# Patient Record
Sex: FEMALE | Race: BLACK OR AFRICAN AMERICAN | Employment: FULL TIME | URBAN - METROPOLITAN AREA
[De-identification: names, ages, dates, MRNs, and addresses within clinical notes are randomized per-mention and may not be internally consistent; named-entity substitution may affect disease eponyms.]

---

## 2022-11-22 ENCOUNTER — OFFICE VISIT (OUTPATIENT)
Dept: INTERNAL MEDICINE CLINIC | Age: 27
End: 2022-11-22

## 2022-11-22 VITALS
HEIGHT: 56 IN | WEIGHT: 121.6 LBS | HEART RATE: 117 BPM | BODY MASS INDEX: 27.36 KG/M2 | DIASTOLIC BLOOD PRESSURE: 76 MMHG | SYSTOLIC BLOOD PRESSURE: 102 MMHG | OXYGEN SATURATION: 97 %

## 2022-11-22 DIAGNOSIS — S62.101G CLOSED FRACTURE OF RIGHT WRIST WITH DELAYED HEALING, SUBSEQUENT ENCOUNTER: ICD-10-CM

## 2022-11-22 DIAGNOSIS — Z76.89 ESTABLISHING CARE WITH NEW DOCTOR, ENCOUNTER FOR: ICD-10-CM

## 2022-11-22 DIAGNOSIS — E11.65 TYPE 2 DIABETES MELLITUS WITH HYPERGLYCEMIA, WITHOUT LONG-TERM CURRENT USE OF INSULIN (HCC): Primary | ICD-10-CM

## 2022-11-22 LAB
CHP ED QC CHECK: NORMAL
GLUCOSE BLD-MCNC: 387 MG/DL
HBA1C MFR BLD: 11.6 %

## 2022-11-22 PROCEDURE — 99204 OFFICE O/P NEW MOD 45 MIN: CPT | Performed by: NURSE PRACTITIONER

## 2022-11-22 PROCEDURE — 3046F HEMOGLOBIN A1C LEVEL >9.0%: CPT | Performed by: NURSE PRACTITIONER

## 2022-11-22 PROCEDURE — 83036 HEMOGLOBIN GLYCOSYLATED A1C: CPT | Performed by: NURSE PRACTITIONER

## 2022-11-22 PROCEDURE — 82962 GLUCOSE BLOOD TEST: CPT | Performed by: NURSE PRACTITIONER

## 2022-11-22 RX ORDER — ONDANSETRON 4 MG/1
TABLET, ORALLY DISINTEGRATING ORAL EVERY 8 HOURS PRN
COMMUNITY
Start: 2022-04-26 | End: 2022-11-22

## 2022-11-22 RX ORDER — LANCETS 30 GAUGE
1 EACH MISCELLANEOUS
Qty: 100 EACH | Refills: 2 | Status: SHIPPED | OUTPATIENT
Start: 2022-11-22

## 2022-11-22 RX ORDER — FLUTICASONE PROPIONATE 50 MCG
2 SPRAY, SUSPENSION (ML) NASAL DAILY
COMMUNITY
End: 2022-11-22

## 2022-11-22 RX ORDER — AMOXICILLIN AND CLAVULANATE POTASSIUM 875; 125 MG/1; MG/1
TABLET, FILM COATED ORAL
COMMUNITY
Start: 2022-10-18 | End: 2022-11-22

## 2022-11-22 RX ORDER — ATORVASTATIN CALCIUM 10 MG/1
TABLET, FILM COATED ORAL DAILY
COMMUNITY
Start: 2020-08-06 | End: 2022-11-22

## 2022-11-22 RX ORDER — HYDROCODONE BITARTRATE AND ACETAMINOPHEN 5; 325 MG/1; MG/1
TABLET ORAL
COMMUNITY
Start: 2022-10-29 | End: 2022-11-22

## 2022-11-22 RX ORDER — GLUCOSAMINE HCL/CHONDROITIN SU 500-400 MG
1 CAPSULE ORAL
Qty: 100 STRIP | Refills: 2 | Status: SHIPPED | OUTPATIENT
Start: 2022-11-22

## 2022-11-22 RX ORDER — BLOOD-GLUCOSE METER
1 KIT MISCELLANEOUS
Qty: 1 KIT | Refills: 0 | Status: SHIPPED | OUTPATIENT
Start: 2022-11-22

## 2022-11-22 RX ORDER — FAMOTIDINE 20 MG/1
TABLET, FILM COATED ORAL 2 TIMES DAILY
COMMUNITY
Start: 2019-07-07 | End: 2022-11-22

## 2022-11-22 RX ORDER — LORATADINE 10 MG/1
TABLET ORAL DAILY
COMMUNITY
End: 2022-11-22

## 2022-11-22 RX ORDER — INSULIN GLARGINE 100 [IU]/ML
15 INJECTION, SOLUTION SUBCUTANEOUS NIGHTLY
Qty: 5 ADJUSTABLE DOSE PRE-FILLED PEN SYRINGE | Refills: 2 | Status: SHIPPED | OUTPATIENT
Start: 2022-11-22 | End: 2022-12-01 | Stop reason: ALTCHOICE

## 2022-11-22 RX ORDER — ONDANSETRON 4 MG/1
TABLET, FILM COATED ORAL EVERY 8 HOURS PRN
COMMUNITY
Start: 2019-07-07

## 2022-11-22 SDOH — ECONOMIC STABILITY: FOOD INSECURITY: WITHIN THE PAST 12 MONTHS, THE FOOD YOU BOUGHT JUST DIDN'T LAST AND YOU DIDN'T HAVE MONEY TO GET MORE.: NEVER TRUE

## 2022-11-22 SDOH — ECONOMIC STABILITY: FOOD INSECURITY: WITHIN THE PAST 12 MONTHS, YOU WORRIED THAT YOUR FOOD WOULD RUN OUT BEFORE YOU GOT MONEY TO BUY MORE.: NEVER TRUE

## 2022-11-22 ASSESSMENT — PATIENT HEALTH QUESTIONNAIRE - PHQ9
SUM OF ALL RESPONSES TO PHQ QUESTIONS 1-9: 0
1. LITTLE INTEREST OR PLEASURE IN DOING THINGS: 0
SUM OF ALL RESPONSES TO PHQ QUESTIONS 1-9: 0
2. FEELING DOWN, DEPRESSED OR HOPELESS: 0
SUM OF ALL RESPONSES TO PHQ9 QUESTIONS 1 & 2: 0

## 2022-11-22 ASSESSMENT — ENCOUNTER SYMPTOMS
RESPIRATORY NEGATIVE: 1
GASTROINTESTINAL NEGATIVE: 1

## 2022-11-22 ASSESSMENT — SOCIAL DETERMINANTS OF HEALTH (SDOH): HOW HARD IS IT FOR YOU TO PAY FOR THE VERY BASICS LIKE FOOD, HOUSING, MEDICAL CARE, AND HEATING?: NOT HARD AT ALL

## 2022-11-22 NOTE — PROGRESS NOTES
Patient: Barrett Hernandez is a 32 y.o. female who presents today with the following Chief Complaint(s):  Chief Complaint   Patient presents with    New Patient     Establish care       HPI  Presents to the office as a new patient to establish care. Has a history of diabetes but has not been on medication for over a year due to insurance not covering medications. Needs referral for orthopedic specialist for wrist fracture. She is currently in a cast.  Was being seen at [facility] but insurances does not cover the procedure there. Diabetes  She presents for her follow-up diabetic visit. She has type 2 diabetes mellitus. Her disease course has been worsening. There are no diabetic associated symptoms. Risk factors for coronary artery disease include diabetes mellitus. Current diabetic treatment includes diet. Meal planning includes avoidance of concentrated sweets. (A1c is 11. 6) An ACE inhibitor/angiotensin II receptor blocker is not being taken. Current Outpatient Medications   Medication Sig Dispense Refill    metFORMIN (GLUCOPHAGE) 500 MG tablet Take 1 tablet by mouth 2 times daily (with meals) 60 tablet 1    insulin glargine (LANTUS SOLOSTAR) 100 UNIT/ML injection pen Inject 15 Units into the skin nightly 5 Adjustable Dose Pre-filled Pen Syringe 2    glucose monitoring (FREESTYLE FREEDOM) kit 1 kit by Does not apply route 4 times daily (before meals and nightly) 1 kit 0    Lancets MISC 1 each by Does not apply route 4 times daily (before meals and nightly) 100 each 2    blood glucose monitor strips 1 strip by Other route 4 times daily (before meals and nightly) 100 strip 2    Insulin Pen Needle 31G X 5 MM MISC 1 each by Does not apply route daily 100 each 3    ondansetron (ZOFRAN) 4 MG tablet Take by mouth every 8 hours as needed (Patient not taking: Reported on 11/22/2022)       No current facility-administered medications for this visit.        Patient's past medical history, surgical history, family history, medications,  and allergies  were all reviewed and updated as appropriate today. There is no problem list on file for this patient. Past Medical History:   Diagnosis Date    Type 2 diabetes mellitus without complication ([address]       No past surgical history on file. Family History   Problem Relation Age of Onset    High Blood Pressure Mother       No Known Allergies      Review of Systems   Constitutional: Negative. HENT: Negative. Respiratory: Negative. Cardiovascular: Negative. Gastrointestinal: Negative. Genitourinary: Negative. Musculoskeletal:  Positive for arthralgias (Right wrist fracture). Skin: Negative. Neurological: Negative. Psychiatric/Behavioral: Negative. Vitals:    11/22/22 1614   BP: 102/76   Site: Left Upper Arm   Position: Sitting   Cuff Size: Small Adult   Pulse: (!) 117   SpO2: 97%   Weight: 121 lb 9.6 oz (55.2 kg)   Height: 4' 7.5\" (1.41 m)     Physical Exam  Constitutional:       General: She is not in acute distress. Appearance: Normal appearance. She is not ill-appearing, toxic-appearing or diaphoretic. HENT:      Head: Normocephalic. Eyes:      Conjunctiva/sclera: Conjunctivae normal.   Cardiovascular:      Rate and Rhythm: Regular rhythm. Tachycardia present. Pulses: Normal pulses. Heart sounds: Normal heart sounds. No murmur heard. No friction rub. No gallop. Pulmonary:      Effort: Pulmonary effort is normal. No respiratory distress. Breath sounds: Normal breath sounds. No stridor. No wheezing, rhonchi or rales. Abdominal:      General: Bowel sounds are normal. There is no distension. Palpations: Abdomen is soft. There is no mass. Tenderness: There is no abdominal tenderness. There is no guarding. Hernia: No hernia is present. Musculoskeletal:         General: Normal range of motion. Cervical back: Normal range of motion and neck supple. No rigidity. Right lower leg: No edema.       Left lower leg: No edema. Skin:     General: Skin is warm and dry. Neurological:      Mental Status: She is alert and oriented to person, place, and time. Psychiatric:         Mood and Affect: Mood normal.         Behavior: Behavior normal.         Thought Content: Thought content normal.         Judgment: Judgment normal.          Assessment/Plan:  1. Type 2 diabetes mellitus with hyperglycemia, without long-term current use of insulin (HCC)  - insulin glargine (LANTUS SOLOSTAR) 100 UNIT/ML injection pen; Inject 15 Units into the skin nightly  Dispense: 5 Adjustable Dose Pre-filled Pen Syringe; Refill: 2  - glucose monitoring (FREESTYLE FREEDOM) kit; 1 kit by Does not apply route 4 times daily (before meals and nightly)  Dispense: 1 kit; Refill: 0  - Lancets MISC; 1 each by Does not apply route 4 times daily (before meals and nightly)  Dispense: 100 each; Refill: 2  - blood glucose monitor strips; 1 strip by Other route 4 times daily (before meals and nightly)  Dispense: 100 strip; Refill: 2  - Insulin Pen Needle 31G X 5 MM MISC; 1 each by Does not apply route daily  Dispense: 100 each; Refill: 3  - Educational pamphlets given  -Says  will be able to help give her injections while she has the cast on her wrist.    2. Closed fracture of right wrist with delayed healing, subsequent encounter  - [address]    3. Establishing care with new doctor, encounter for  - POCT glycosylated hemoglobin (Hb A1C)  - POCT Glucose      Return in about 1 month (around 12/22/2022), or if symptoms worsen or fail to improve.

## 2022-11-22 NOTE — PATIENT INSTRUCTIONS
Your A1c is 11.6. Your goal is to be 6.5 or lower. Start taking metformin 500 mg once a day and then increase to twice a day with meals as tolerated. Start checking your blood sugar 3 times a day before meals and nightly before bed. Start taking Lantus 15 units nightly. Make sure to eat a low-carb diet. Refer to the handouts    [facility] [place] Laboratory Locations - No appointment necessary. @ indicates the location is open Saturdays in addition to Monday through Friday. Call your preferred location for test preparation, business hours and other information you need. [employer] accepts [employer]. [facility]    @ [place] Lab Svcs. 3 [facility] [address] [place] [address]. [place], [address]   Ph: [phone] [facility] Lab Svcs. [address] [facility] [address]   Ph: [phone]  @ [place] [address] Lab Svcs. [address]   Ph: [phone]    [facility] Lab Svcs. [address]   Ph: [phone] @ [place] [facility] Svcs. [address]  Ph: [phone] @ [facility] Sv[facility]. [address] [facility]. [place], [address]   Ph: [phone]     Jeanine Lowery Svcs. [facility], [address]  Ph: [phone]    [place]   @ [place] Lab Svcs. [address] [place], [place] [address]   Ph: [phone] [facility]. Office Bldg. [address] Jim[address], [address] [facility]  Ph: [address] [place] [address]   [facility]:  [address] Lab Svcs[address] 54 [facility]   Ph: [phone]. Lab Svcs.   [address] [facility], [address]   Ph: [phone]

## 2022-11-30 ENCOUNTER — OFFICE VISIT (OUTPATIENT)
Dept: ORTHOPEDIC SURGERY | Age: 27
End: 2022-11-30

## 2022-11-30 VITALS — BODY MASS INDEX: 28.12 KG/M2 | WEIGHT: 125 LBS | HEIGHT: 56 IN

## 2022-11-30 DIAGNOSIS — F17.200 CURRENT SMOKER: ICD-10-CM

## 2022-11-30 DIAGNOSIS — M25.531 RIGHT WRIST PAIN: ICD-10-CM

## 2022-11-30 DIAGNOSIS — S52.591A OTHER CLOSED FRACTURE OF DISTAL END OF RIGHT RADIUS, INITIAL ENCOUNTER: Primary | ICD-10-CM

## 2022-11-30 PROBLEM — S52.501A CLOSED FRACTURE OF RIGHT DISTAL RADIUS: Status: ACTIVE | Noted: 2022-11-30

## 2022-11-30 NOTE — LETTER
[facility]  Surgery Scheduling Form:      DEMOGRAPHICS:                                                                                                                  Patient Name:  Chelly Nielson  Patient :  1995   Patient SS#:      Patient Phone:  [phone] (home)  Alt. Patient Phone:    Patient Address:  [address] [place] [id number]    PCP:  NGHIA Sanders    Insurance ID Number:  Payer/Plan Subscr  Sex Relation Sub. Ins. ID Effective Group Num         DIAGNOSIS & PROCEDURE:                                                                                                Diagnosis:   Right Distal radius fracture  S52.501A  Operation:  Open reduction internal fixation Right distal radius [address]37  Location: [place]  Provider:  Vero Caldwell. Angela Tilley MD      [facility] INFORMATION:                                                                                         .    Requested Date:      OR Time:  8:15 AM                      Patient Arrival Time:  6:30 AM  OR Time Required:  30 Minutes  Anesthesia:  General  Equipment:  Hilda  Mini C-Arm:  Yes   Standard C-Arm:  No  Status:  Outpatient  PAT Required:  Yes  Comments:             Gordon Tilley MD     22         Pre Operative Physician Prophylaxis Orders - SCIP Protocols      Patient: Chelly Nielson  :    1995    Procedure:  Open reduction internal fixation Right distal radius [address]      HEIGHT:  Ht Readings from Last 1 Encounters:   22 4' 7.5\" (1.41 m)                      WEIGHT:  Wt Readings from Last 1 Encounters:   22 125 lb (56.7 kg)         History & Physical:  By Surgeon    Pre-Operative Antibiotic Order:     []  ----  No Antibiotic Ordered       [x]  ----  Give the following Antibiotic within 1 hour prior to start time:                                                      Cefazolin 2g IV <119.9kg (264lbs) OR 3g if >120kg (264lbs)       or      Clindamycin 900 mg IV (over 1 hour) if patient is allergic to           PENICILLINS or CAPHALOSPORIN       VTE prophylaxis [ ] Thigh hi  TEDS  [ ]  Knee Hi TEDS [ ] Foot Pumps [ ] Compression Devices      Physician Signature:     Date: 11/30/22  Time: 2:09 PM
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
## 2022-12-01 ENCOUNTER — TELEPHONE (OUTPATIENT)
Dept: ORTHOPEDIC SURGERY | Age: 27
End: 2022-12-01

## 2022-12-01 NOTE — TELEPHONE ENCOUNTER
Auth: NPR  Date: 12/05/22  Reference # None  Spoke with: None  Type of SX: Outpatient  Location: [facility]  CPT: 82524   DX: S52.501A  SX area:  Rt wrist  Insurance: Self-Pay

## 2022-12-01 NOTE — PROGRESS NOTES
[address] [facility] [place] time _____0615_______        Surgery time ___0815_________    Take the following medications with a sip of water: Follow your MD/Surgeons pre-procedure instructions regarding your medications     Do not eat or drink anything after 12:00 midnight prior to your surgery. This includes water chewing gum, mints and ice chips. You may brush your teeth and gargle the morning of your surgery, but do not swallow the water     Please see your family doctor/pediatrician for a history and physical and/or concerning medications. Bring any test results/reports from your physicians office. If you are under the care of a heart doctor or specialist doctor, please be aware that you may be asked to them for clearance    You may be asked to stop blood thinners such as Coumadin, Plavix, Fragmin, Lovenox, etc., or any anti-inflammatories such as:  Aspirin, Ibuprofen, Advil, Naproxen prior to your surgery. We also ask that you stop any OTC medications such as fish oil, vitamin E, glucosamine, garlic, Multivitamins, COQ 10, etc.    We ask that you do not smoke 24 hours prior to surgery  We ask that you do not  drink any alcoholic beverages 24 hours prior to surgery     You must make arrangements for a responsible adult to take you home after your surgery. For your safety you will not be allowed to leave alone or drive yourself home. Your surgery will be cancelled if you do not have a ride home. Also for your safety, it is strongly suggested that someone stay with you the first 24 hours after your surgery. A parent or legal guardian must accompany a child scheduled for surgery and plan to stay at the hospital until the child is discharged. Please do not bring other children with you. For your comfort, please wear simple loose fitting clothing to the hospital.  Please do not bring valuables.     Do not wear any make-up or nail polish on your fingers or toes      For your safety, please do not wear any jewelry or body piercing's on the day of surgery. All jewelry must be removed. If you have dentures, they will be removed before going to operating room. For your convenience, we will provide you with a container. If you wear contact lenses or glasses, they will be removed, please bring a case for them. If you have a living will and a durable power of  for healthcare, please bring in a copy. As part of our patient safety program to minimize surgical site infections, we ask you to do the following:    Please notify your surgeon if you develop any illness between         now and the  day of your surgery. This includes a cough, cold, fever, sore throat, nausea,         or vomiting, and diarrhea, etc.   Please notify your surgeon if you experience dizziness, shortness         of breath or blurred vision between now and the time of your surgery. Do not shave your operative site 96 hours prior to surgery. For face and neck surgery, men may use an electric razor 48 hours   prior to surgery. You may shower the night before surgery or the morning of   your surgery with an antibacterial soap. You will need to bring a photo ID and insurance card    [facility] has an onsite pharmacy, would you like to utilize our pharmacy     If you will be staying overnight and use a C-pap machine, please bring   your C-pap to hospital     Our goal is to provide you with excellent care, therefore, visitors will be limited to two(2) in the room at a time so that we may focus on providing this care for you. Please contact pre-admission testing if you have any further questions. [facility] phone number:  [phone] PAT fax number:  [phone]  Please note these are generalized instructions for all surgical cases, you may be provided with more specific instructions according to your surgery.     C-Difficile admission screening and protocol:       * Admitted with diarrhea? [] YES    [x]  NO     *Prior history of C-Diff. In last 3 months? [] YES    [x]  NO     *Antibiotic use in the past 6-8 weeks? []  NO    [x]  YES                 If yes, which ANTIBIOTIC AND REASON_infected thumb_____     *Prior hospitalization or nursing home in the last month? []  YES    [x]  NO        SAFETY FIRST. .call before you fall

## 2022-12-01 NOTE — TELEPHONE ENCOUNTER
Called patient. Gave her a time for surgery on Monday. Discussed medications. Patient is going to fax over paperwork to be off work. She would like to remain out atleast for the 2 week PO time period, though longer if possible.

## 2022-12-02 ENCOUNTER — ANESTHESIA EVENT (OUTPATIENT)
Dept: OPERATING ROOM | Age: 27
End: 2022-12-02
Payer: COMMERCIAL

## 2022-12-02 NOTE — TELEPHONE ENCOUNTER
Spoke with Thomas @ [facility]. SX requires an authorizaiton. Transferred to Leeanne (Supervisor). They are unsure of who does the Precert. Leeanne is to call me back.

## 2022-12-02 NOTE — TELEPHONE ENCOUNTER
On hold for 45 mins, someone picked up said hello and then nothing. After waiting 50 mins, they hung up on me. Called back starting time 2:41. After an hr finally got thru no new information. Still unable to get precert started.

## 2022-12-05 ENCOUNTER — HOSPITAL ENCOUNTER (OUTPATIENT)
Age: 27
Setting detail: OUTPATIENT SURGERY
Discharge: HOME | End: 2022-12-05
Attending: ORTHOPAEDIC SURGERY | Admitting: ORTHOPAEDIC SURGERY
Payer: COMMERCIAL

## 2022-12-05 ENCOUNTER — APPOINTMENT (OUTPATIENT)
Dept: GENERAL RADIOLOGY | Age: 27
End: 2022-12-05
Attending: ORTHOPAEDIC SURGERY
Payer: COMMERCIAL

## 2022-12-05 ENCOUNTER — ANESTHESIA (OUTPATIENT)
Dept: OPERATING ROOM | Age: 27
End: 2022-12-05
Payer: COMMERCIAL

## 2022-12-05 VITALS
HEART RATE: 102 BPM | BODY MASS INDEX: 28.93 KG/M2 | OXYGEN SATURATION: 96 % | SYSTOLIC BLOOD PRESSURE: 138 MMHG | DIASTOLIC BLOOD PRESSURE: 92 MMHG | HEIGHT: 55 IN | WEIGHT: 125 LBS | RESPIRATION RATE: 15 BRPM | TEMPERATURE: 97 F

## 2022-12-05 DIAGNOSIS — S52.571A OTHER CLOSED INTRA-ARTICULAR FRACTURE OF DISTAL END OF RIGHT RADIUS, INITIAL ENCOUNTER: Primary | ICD-10-CM

## 2022-12-05 LAB
GLUCOSE BLD-MCNC: 288 MG/DL (ref 70–99)
GLUCOSE BLD-MCNC: 293 MG/DL (ref 70–99)
PERFORMED ON: ABNORMAL
PERFORMED ON: ABNORMAL
PREGNANCY, URINE: NEGATIVE

## 2022-12-05 PROCEDURE — 2580000003 HC RX 258: Performed by: ANESTHESIOLOGY

## 2022-12-05 PROCEDURE — 6360000002 HC RX W HCPCS: Performed by: ANESTHESIOLOGY

## 2022-12-05 PROCEDURE — C1769 GUIDE WIRE: HCPCS | Performed by: ORTHOPAEDIC SURGERY

## 2022-12-05 PROCEDURE — 6360000002 HC RX W HCPCS

## 2022-12-05 PROCEDURE — 2709999900 HC NON-CHARGEABLE SUPPLY: Performed by: ORTHOPAEDIC SURGERY

## 2022-12-05 PROCEDURE — 3700000001 HC ADD 15 MINUTES (ANESTHESIA): Performed by: ORTHOPAEDIC SURGERY

## 2022-12-05 PROCEDURE — 7100000000 HC PACU RECOVERY - FIRST 15 MIN: Performed by: ORTHOPAEDIC SURGERY

## 2022-12-05 PROCEDURE — 7100000011 HC PHASE II RECOVERY - ADDTL 15 MIN: Performed by: ORTHOPAEDIC SURGERY

## 2022-12-05 PROCEDURE — 73100 X-RAY EXAM OF WRIST: CPT

## 2022-12-05 PROCEDURE — 3700000000 HC ANESTHESIA ATTENDED CARE: Performed by: ORTHOPAEDIC SURGERY

## 2022-12-05 PROCEDURE — 84703 CHORIONIC GONADOTROPIN ASSAY: CPT

## 2022-12-05 PROCEDURE — 7100000010 HC PHASE II RECOVERY - FIRST 15 MIN: Performed by: ORTHOPAEDIC SURGERY

## 2022-12-05 PROCEDURE — 3600000014 HC SURGERY LEVEL 4 ADDTL 15MIN: Performed by: ORTHOPAEDIC SURGERY

## 2022-12-05 PROCEDURE — A4217 STERILE WATER/SALINE, 500 ML: HCPCS | Performed by: ORTHOPAEDIC SURGERY

## 2022-12-05 PROCEDURE — C1713 ANCHOR/SCREW BN/BN,TIS/BN: HCPCS | Performed by: ORTHOPAEDIC SURGERY

## 2022-12-05 PROCEDURE — 6360000002 HC RX W HCPCS: Performed by: ORTHOPAEDIC SURGERY

## 2022-12-05 PROCEDURE — 3209999900 FLUORO FOR SURGICAL PROCEDURES

## 2022-12-05 PROCEDURE — 7100000001 HC PACU RECOVERY - ADDTL 15 MIN: Performed by: ORTHOPAEDIC SURGERY

## 2022-12-05 PROCEDURE — 2580000003 HC RX 258: Performed by: ORTHOPAEDIC SURGERY

## 2022-12-05 PROCEDURE — 3600000004 HC SURGERY LEVEL 4 BASE: Performed by: ORTHOPAEDIC SURGERY

## 2022-12-05 PROCEDURE — 2720000010 HC SURG SUPPLY STERILE: Performed by: ORTHOPAEDIC SURGERY

## 2022-12-05 PROCEDURE — 2500000003 HC RX 250 WO HCPCS: Performed by: ORTHOPAEDIC SURGERY

## 2022-12-05 PROCEDURE — 2500000003 HC RX 250 WO HCPCS

## 2022-12-05 DEVICE — BONE SCREW, FULLY THREADED, T8
Type: IMPLANTABLE DEVICE | Site: WRIST | Status: FUNCTIONAL
Brand: VARIAX

## 2022-12-05 DEVICE — LOCKING SCREW, FULLY THREADED,T8
Type: IMPLANTABLE DEVICE | Site: WRIST | Status: FUNCTIONAL
Brand: VARIAX

## 2022-12-05 DEVICE — VOLAR PLATE INTERMEDIATE RIGHT, X-SHORT
Type: IMPLANTABLE DEVICE | Site: WRIST | Status: FUNCTIONAL
Brand: VARIAX

## 2022-12-05 RX ORDER — FENTANYL CITRATE 50 UG/ML
INJECTION, SOLUTION INTRAMUSCULAR; INTRAVENOUS PRN
Status: DISCONTINUED | OUTPATIENT
Start: 2022-12-05 | End: 2022-12-05 | Stop reason: SDUPTHER

## 2022-12-05 RX ORDER — FENTANYL CITRATE 50 UG/ML
25 INJECTION, SOLUTION INTRAMUSCULAR; INTRAVENOUS EVERY 5 MIN PRN
Status: DISCONTINUED | OUTPATIENT
Start: 2022-12-05 | End: 2022-12-05 | Stop reason: HOSPADM

## 2022-12-05 RX ORDER — ONDANSETRON 2 MG/ML
4 INJECTION INTRAMUSCULAR; INTRAVENOUS
Status: COMPLETED | OUTPATIENT
Start: 2022-12-05 | End: 2022-12-05

## 2022-12-05 RX ORDER — SODIUM CHLORIDE 0.9 % (FLUSH) 0.9 %
5-40 SYRINGE (ML) INJECTION EVERY 12 HOURS SCHEDULED
Status: DISCONTINUED | OUTPATIENT
Start: 2022-12-05 | End: 2022-12-05 | Stop reason: HOSPADM

## 2022-12-05 RX ORDER — KETOROLAC TROMETHAMINE 30 MG/ML
INJECTION, SOLUTION INTRAMUSCULAR; INTRAVENOUS PRN
Status: DISCONTINUED | OUTPATIENT
Start: 2022-12-05 | End: 2022-12-05 | Stop reason: SDUPTHER

## 2022-12-05 RX ORDER — ONDANSETRON 2 MG/ML
INJECTION INTRAMUSCULAR; INTRAVENOUS PRN
Status: DISCONTINUED | OUTPATIENT
Start: 2022-12-05 | End: 2022-12-05 | Stop reason: SDUPTHER

## 2022-12-05 RX ORDER — ESMOLOL HYDROCHLORIDE 10 MG/ML
INJECTION INTRAVENOUS PRN
Status: DISCONTINUED | OUTPATIENT
Start: 2022-12-05 | End: 2022-12-05 | Stop reason: SDUPTHER

## 2022-12-05 RX ORDER — DEXAMETHASONE SODIUM PHOSPHATE 4 MG/ML
INJECTION, SOLUTION INTRA-ARTICULAR; INTRALESIONAL; INTRAMUSCULAR; INTRAVENOUS; SOFT TISSUE PRN
Status: DISCONTINUED | OUTPATIENT
Start: 2022-12-05 | End: 2022-12-05 | Stop reason: SDUPTHER

## 2022-12-05 RX ORDER — CEPHALEXIN 500 MG/1
500 CAPSULE ORAL 4 TIMES DAILY
Qty: 20 CAPSULE | Refills: 0 | Status: SHIPPED | OUTPATIENT
Start: 2022-12-05 | End: 2022-12-10

## 2022-12-05 RX ORDER — PROPOFOL 10 MG/ML
INJECTION, EMULSION INTRAVENOUS PRN
Status: DISCONTINUED | OUTPATIENT
Start: 2022-12-05 | End: 2022-12-05 | Stop reason: SDUPTHER

## 2022-12-05 RX ORDER — SODIUM CHLORIDE 0.9 % (FLUSH) 0.9 %
5-40 SYRINGE (ML) INJECTION PRN
Status: DISCONTINUED | OUTPATIENT
Start: 2022-12-05 | End: 2022-12-05 | Stop reason: HOSPADM

## 2022-12-05 RX ORDER — SODIUM CHLORIDE 9 MG/ML
INJECTION, SOLUTION INTRAVENOUS PRN
Status: DISCONTINUED | OUTPATIENT
Start: 2022-12-05 | End: 2022-12-05 | Stop reason: HOSPADM

## 2022-12-05 RX ORDER — HYDROCODONE BITARTRATE AND ACETAMINOPHEN 5; 325 MG/1; MG/1
1 TABLET ORAL EVERY 6 HOURS PRN
Qty: 20 TABLET | Refills: 0 | Status: SHIPPED | OUTPATIENT
Start: 2022-12-05 | End: 2022-12-10

## 2022-12-05 RX ORDER — LIDOCAINE HYDROCHLORIDE 20 MG/ML
INJECTION, SOLUTION EPIDURAL; INFILTRATION; INTRACAUDAL; PERINEURAL PRN
Status: DISCONTINUED | OUTPATIENT
Start: 2022-12-05 | End: 2022-12-05 | Stop reason: SDUPTHER

## 2022-12-05 RX ORDER — SODIUM CHLORIDE 9 MG/ML
INJECTION, SOLUTION INTRAVENOUS CONTINUOUS
Status: DISCONTINUED | OUTPATIENT
Start: 2022-12-05 | End: 2022-12-05 | Stop reason: HOSPADM

## 2022-12-05 RX ORDER — BUPIVACAINE HYDROCHLORIDE 5 MG/ML
INJECTION, SOLUTION EPIDURAL; INTRACAUDAL
Status: COMPLETED | OUTPATIENT
Start: 2022-12-05 | End: 2022-12-05

## 2022-12-05 RX ORDER — FAMOTIDINE 10 MG/ML
INJECTION, SOLUTION INTRAVENOUS PRN
Status: DISCONTINUED | OUTPATIENT
Start: 2022-12-05 | End: 2022-12-05 | Stop reason: SDUPTHER

## 2022-12-05 RX ORDER — MIDAZOLAM HYDROCHLORIDE 1 MG/ML
INJECTION INTRAMUSCULAR; INTRAVENOUS PRN
Status: DISCONTINUED | OUTPATIENT
Start: 2022-12-05 | End: 2022-12-05 | Stop reason: SDUPTHER

## 2022-12-05 RX ADMIN — MIDAZOLAM 2 MG: 1 INJECTION INTRAMUSCULAR; INTRAVENOUS at 08:17

## 2022-12-05 RX ADMIN — HYDROMORPHONE HYDROCHLORIDE 0.75 MG: 1 INJECTION, SOLUTION INTRAMUSCULAR; INTRAVENOUS; SUBCUTANEOUS at 08:34

## 2022-12-05 RX ADMIN — PROPOFOL 50 MG: 10 INJECTION, EMULSION INTRAVENOUS at 08:18

## 2022-12-05 RX ADMIN — LIDOCAINE HYDROCHLORIDE 80 MG: 20 INJECTION, SOLUTION EPIDURAL; INFILTRATION; INTRACAUDAL; PERINEURAL at 08:18

## 2022-12-05 RX ADMIN — FENTANYL CITRATE 50 MCG: 50 INJECTION INTRAMUSCULAR; INTRAVENOUS at 08:39

## 2022-12-05 RX ADMIN — SODIUM CHLORIDE: 9 INJECTION, SOLUTION INTRAVENOUS at 11:20

## 2022-12-05 RX ADMIN — ONDANSETRON 4 MG: 2 INJECTION INTRAMUSCULAR; INTRAVENOUS at 10:03

## 2022-12-05 RX ADMIN — FAMOTIDINE 20 MG: 10 INJECTION, SOLUTION INTRAVENOUS at 08:17

## 2022-12-05 RX ADMIN — DEXAMETHASONE SODIUM PHOSPHATE 4 MG: 4 INJECTION, SOLUTION INTRAMUSCULAR; INTRAVENOUS at 08:24

## 2022-12-05 RX ADMIN — CEFAZOLIN 2000 MG: 2 INJECTION, POWDER, FOR SOLUTION INTRAMUSCULAR; INTRAVENOUS at 08:25

## 2022-12-05 RX ADMIN — KETOROLAC TROMETHAMINE 30 MG: 30 INJECTION, SOLUTION INTRAMUSCULAR at 09:27

## 2022-12-05 RX ADMIN — HYDROMORPHONE HYDROCHLORIDE 0.25 MG: 1 INJECTION, SOLUTION INTRAMUSCULAR; INTRAVENOUS; SUBCUTANEOUS at 08:17

## 2022-12-05 RX ADMIN — FENTANYL CITRATE 50 MCG: 50 INJECTION INTRAMUSCULAR; INTRAVENOUS at 08:18

## 2022-12-05 RX ADMIN — SODIUM CHLORIDE: 9 INJECTION, SOLUTION INTRAVENOUS at 08:17

## 2022-12-05 RX ADMIN — ESMOLOL HYDROCHLORIDE 10 MG: 100 INJECTION, SOLUTION INTRAVENOUS at 09:27

## 2022-12-05 RX ADMIN — ONDANSETRON 4 MG: 2 INJECTION INTRAMUSCULAR; INTRAVENOUS at 08:24

## 2022-12-05 ASSESSMENT — PAIN DESCRIPTION - PAIN TYPE: TYPE: SURGICAL PAIN

## 2022-12-05 ASSESSMENT — PAIN - FUNCTIONAL ASSESSMENT
PAIN_FUNCTIONAL_ASSESSMENT: NONE - DENIES PAIN
PAIN_FUNCTIONAL_ASSESSMENT: PREVENTS OR INTERFERES SOME ACTIVE ACTIVITIES AND ADLS
PAIN_FUNCTIONAL_ASSESSMENT: 0-10
PAIN_FUNCTIONAL_ASSESSMENT: PREVENTS OR INTERFERES SOME ACTIVE ACTIVITIES AND ADLS

## 2022-12-05 ASSESSMENT — ENCOUNTER SYMPTOMS: SHORTNESS OF BREATH: 0

## 2022-12-05 ASSESSMENT — PAIN DESCRIPTION - ONSET: ONSET: ON-GOING

## 2022-12-05 ASSESSMENT — PAIN DESCRIPTION - ORIENTATION: ORIENTATION: RIGHT

## 2022-12-05 ASSESSMENT — PAIN DESCRIPTION - FREQUENCY: FREQUENCY: CONTINUOUS

## 2022-12-05 ASSESSMENT — PAIN DESCRIPTION - LOCATION: LOCATION: ARM

## 2022-12-05 ASSESSMENT — PAIN DESCRIPTION - DESCRIPTORS
DESCRIPTORS: DULL;DISCOMFORT
DESCRIPTORS: ACHING

## 2022-12-05 ASSESSMENT — PAIN SCALES - GENERAL: PAINLEVEL_OUTOF10: 4

## 2022-12-05 NOTE — PROGRESS NOTES
Received from PACU. Admitted to Phase 2 care. Awake and alert, respirations easy and even. Oriented to room and surroundings. States \"a little nauseated\". Wants to rest.  IV fluids continue.

## 2022-12-05 NOTE — DISCHARGE INSTRUCTIONS
Post op instruction:  1- D/C home  2- Dx Right wrist pain/ displaced distal radius and ulnar fracture. 3- No Weight Bearing right wrist  4- Elevation surgical site, with ice  5- Keep splint dry and clean  6- F/U in 2 weeks.   7- For DVT prophylaxis- Aspirin 325 mg daily     Terese Pappas MD, 12/5/2022

## 2022-12-05 NOTE — H&P
Preoperative H&P Update    The patient's History and Physical in the medical record from 11/30/2022 was reviewed by me today. Past Medical History:   Diagnosis Date    Type 2 diabetes mellitus without complication ([address]      History reviewed. No pertinent surgical history. No current facility-administered medications on file prior to encounter. Current Outpatient Medications on File Prior to Encounter   Medication Sig Dispense Refill    ondansetron (ZOFRAN) 4 MG tablet Take by mouth every 8 hours as needed (Patient not taking: No sig reported)      metFORMIN (GLUCOPHAGE) 500 MG tablet Take 1 tablet by mouth 2 times daily (with meals) 60 tablet 1    glucose monitoring (FREESTYLE FREEDOM) kit 1 kit by Does not apply route 4 times daily (before meals and nightly) 1 kit 0    Lancets MISC 1 each by Does not apply route 4 times daily (before meals and nightly) 100 each 2    blood glucose monitor strips 1 strip by Other route 4 times daily (before meals and nightly) 100 strip 2    Insulin Pen Needle 31G X 5 MM MISC 1 each by Does not apply route daily 100 each 3       No Known Allergies   I reviewed the HPI, medications, allergies, reason for surgery, diagnosis and treatment plan and there has been no change. The patient was evaluated by me today. Physical exam findings for this update include: There were no vitals filed for this visit. Airway is intact  Chest: chest clear, no wheezing, rales, normal symmetric air entry, no tachypnea, retractions or cyanosis  Heart: regular rate and rhythm ; heart sounds normal  Findings on exam of the body region where surgery is to be performed include:  Right wrist pain/ displaced distal radius and ulnar fracture.     Electronically signed by Reynold Wray MD on 12/5/2022 at 7:01 AM

## 2022-12-05 NOTE — PROGRESS NOTES
Taking sips of cola. IV fluids continue. Complains of feeling \"dizzy\".  at bedside. Discharge instructions given to patient and . Verbalize understanding.

## 2022-12-05 NOTE — TELEPHONE ENCOUNTER
Called [phone] again. After a long hold time spoke with [facility] rosalie Man. She tried to transfer me back to [phone] for precert. Informed him that I've already spoken to them twice along with a supervisor. He left a message for a supervisor to call me back priority this morning.

## 2022-12-05 NOTE — ANESTHESIA POSTPROCEDURE EVALUATION
Department of Anesthesiology  Postprocedure Note    Patient: Tatyana Cesar  MRN: [id number]  YOB: 1995  Date of evaluation: 12/5/2022      Procedure Summary     Date: 12/05/22 Room / Location: [address]    Anesthesia Start: 7286 Anesthesia Stop: 2439    Procedure: OPEN REDUCTION INTERNAL FIXATION RIGHT DISTAL RADIUS (Right: Wrist) Diagnosis:       Closed fracture of distal end of right radius, unspecified fracture morphology, initial encounter      (RIGHT DISTAL RADIUS FRACTURE)    Surgeons: Shelley Davila MD Responsible Provider: Diana Cisneros MD    Anesthesia Type: general ASA Status: 3          Anesthesia Type: No value filed.     Gilmer Phase I: Gilmer Score: 10    Gilmer Phase II: Gilmer Score: 9      Anesthesia Post Evaluation    Patient location during evaluation: PACU  Patient participation: complete - patient participated  Level of consciousness: awake and alert  Airway patency: patent  Nausea & Vomiting: no nausea and no vomiting  Complications: no  Cardiovascular status: hemodynamically stable  Respiratory status: acceptable  Hydration status: stable

## 2022-12-05 NOTE — ANESTHESIA PRE PROCEDURE
Department of Anesthesiology  Preprocedure Note       Name:  Cassi Collins   Age:  32 y.o.  :  1995                                          MRN:  [id number]         Date:  2022      Surgeon: Elisabeth Reeder):  Maulik Bowen MD    Procedure: Procedure(s):  OPEN REDUCTION INTERNAL FIXATION RIGHT DISTAL RADIUS    Medications prior to admission:   Prior to Admission medications    Medication Sig Start Date End Date Taking? Authorizing Provider   cephALEXin (KEFLEX) 500 MG capsule Take 1 capsule by mouth 4 times daily for 5 days 12/5/22 12/10/22 Yes Maulik Bowen MD   HYDROcodone-acetaminophen (NORCO) 5-325 MG per tablet Take 1 tablet by mouth every 6 hours as needed for Pain for up to 5 days. Intended supply: 5 days.  Take lowest dose possible to manage pain 12/5/22 12/10/22 Yes Maulik Bowen MD   ondansetron (ZOFRAN) 4 MG tablet Take by mouth every 8 hours as needed  Patient not taking: No sig reported 19   Historical Provider, MD   metFORMIN (GLUCOPHAGE) 500 MG tablet Take 1 tablet by mouth 2 times daily (with meals) 22   NGHIA Maldonado   glucose monitoring (FREESTYLE FREEDOM) kit 1 kit by Does not apply route 4 times daily (before meals and nightly) 22   NGHIA Maldonado   Lancets MISC 1 each by Does not apply route 4 times daily (before meals and nightly) 22   NGHIA Maldonado   blood glucose monitor strips 1 strip by Other route 4 times daily (before meals and nightly) 22   NGHIA Maldonado   Insulin Pen Needle 31G X 5 MM MISC 1 each by Does not apply route daily 22   NGHIA Maldonado       Current medications:    Current Facility-Administered Medications   Medication Dose Route Frequency Provider Last Rate Last Admin    0.9 % sodium chloride infusion   IntraVENous Continuous Klaus Castellon MD        sodium chloride flush 0.9 % injection 5-40 mL  5-40 mL IntraVENous 2 times per day Klaus Castellon MD        sodium chloride flush 0.9 % injection 5-40 mL  5-40 mL IntraVENous PRN Rashaad Guallpa MD        0.9 % sodium chloride infusion   IntraVENous PRN Rashaad Guallpa MD        ceFAZolin (ANCEF) 2,000 mg in dextrose 5 % 50 mL IVPB (mini-bag)  2,000 mg IntraVENous Once Aron Graves MD           Allergies:  No Known Allergies    Problem List:    Patient Active Problem List   Diagnosis Code    Closed fracture of right distal radius S52.501A    Current smoker F17.200       Past Medical History:        Diagnosis Date    Type 2 diabetes mellitus without complication ([facility] [address]        Past Surgical History:  History reviewed. No pertinent surgical history. Social History:    Social History     Tobacco Use    Smoking status: Never    Smokeless tobacco: Never   Substance Use Topics    Alcohol use: Yes     Comment: socially                                Counseling given: Not Answered      Vital Signs (Current):   Vitals:    12/01/22 1210 12/05/22 0635   Weight: 125 lb (56.7 kg) 125 lb (56.7 kg)   Height: 4' 7\" (1.397 m)                                               BP Readings from Last 3 Encounters:   11/22/22 102/76       NPO Status: Time of last liquid consumption: 2100                        Time of last solid consumption: 2100                        Date of last liquid consumption: 12/04/22                        Date of last solid food consumption: 12/04/22    BMI:   Wt Readings from Last 3 Encounters:   12/05/22 125 lb (56.7 kg)   11/30/22 125 lb (56.7 kg)   11/22/22 121 lb 9.6 oz (55.2 kg)     Body mass index is 29.05 kg/m². CBC: No results found for: WBC, RBC, HGB, HCT, MCV, RDW, PLT    CMP:   Lab Results   Component Value Date/Time    GLUCOSE 387 11/22/2022 04:37 PM       POC Tests: No results for input(s): POCGLU, POCNA, POCK, POCCL, POCBUN, POCHEMO, POCHCT in the last 72 hours.     Coags: No results found for: PROTIME, INR, APTT    HCG (If Applicable): No results found for: PREGTESTUR, PREGSERUM, HCG, HCGQUANT     ABGs: No results found for: PHART, PO2ART, ZBB9OAT, VNE9UKV, BEART, W3OETZVY     Type & Screen (If Applicable):  No results found for: LABABO, LABRH    Drug/Infectious Status (If Applicable):  No results found for: HIV, HEPCAB    COVID-19 Screening (If Applicable): No results found for: COVID19        Anesthesia Evaluation  Patient summary reviewed no history of anesthetic complications:   Airway: Mallampati: II  TM distance: >3 FB   Neck ROM: full  Mouth opening: > = 3 FB   Dental:      Comment: No loose teeth    Pulmonary: breath sounds clear to auscultation      (-) COPD, asthma, shortness of breath, recent URI and sleep apnea                          ROS comment: MJ   Cardiovascular:        (-) hypertension, valvular problems/murmurs, past MI, CAD, CABG/stent, dysrhythmias,  angina,  CHF, orthopnea, PND and no pulmonary hypertension      Rhythm: regular  Rate: normal                    Neuro/Psych:      (-) seizures, neuromuscular disease, TIA, CVA, headaches, psychiatric history and depression/anxiety            GI/Hepatic/Renal:        (-) GERD, PUD, hepatitis, liver disease, no renal disease and bowel prep       Endo/Other:    (+) DiabetesType II DM, poorly controlled, , .    (-) hypothyroidism, hyperthyroidism, blood dyscrasia               Abdominal:             Vascular: Other Findings:           Anesthesia Plan      general     ASA 3       Induction: intravenous. MIPS: Postoperative opioids intended and Prophylactic antiemetics administered. Anesthetic plan and risks discussed with patient. Plan discussed with CRNA. This pre-anesthesia assessment may be used as a history and physical.    DOS STAFF ADDENDUM:    Pt seen and examined, chart reviewed (including anesthesia, drug and allergy history). No interval changes to history and physical examination. Anesthetic plan, risks, benefits, alternatives, and personnel involved discussed with patient.   Patient verbalized an understanding and agrees to proceed.       Lulu Rodríguez MD  December 5, 2022  7:05 AM      Lulu Rodríguez MD   12/5/2022

## 2022-12-05 NOTE — PROGRESS NOTES
Pt to phase 2 in stable condition. Pt denies any pain in right arm. Pt states nausea better and ready to get ready to go home. Iv wnl. Circ cks positive and pt able to move fingers and denies numbness or tingling. Dressing dry and intact.

## 2022-12-06 ENCOUNTER — TELEPHONE (OUTPATIENT)
Dept: ORTHOPEDIC SURGERY | Age: 27
End: 2022-12-06

## 2022-12-06 NOTE — TELEPHONE ENCOUNTER
Called [phone] again. Spoke with [address]. He tried to transfer me back to [phone] for precert. Informed him that I've already spoken to them twice along with a supervisor. He saw the  message for a supervisor from yesterday that was marked as priority. No one has looked at it. After an hour on hold he hung up on me.

## 2022-12-06 NOTE — BRIEF OP NOTE
Brief Postoperative Note      Patient: Barrett Hernandez  YOB: 1995  MRN: [id number]    Date of Procedure: 12/5/2022    Pre-Op Diagnosis: RIGHT DISTAL RADIUS FRACTURE    Post-Op Diagnosis: Same       Procedure(s):  OPEN REDUCTION INTERNAL FIXATION RIGHT DISTAL RADIUS    Surgeon(s):  Get Graham MD    Assistant: Kaela Chapman CNP    Anesthesia: General    Estimated Blood Loss (mL): Minimal    Complications: None    Specimens:   * No specimens in log *    Implants:  Implant Name Type Inv.  Item Serial No.  Lot No. LRB No. Used Action   VOLAR DR PLATE INTERMEDIATE RIGHT 10 HOLES EXTRASHORT - [id number]  VOLAR DR PLATE INTERMEDIATE RIGHT 10 HOLES EXTRASHORT  [employer] [facility]  Right 1 Implanted   SCREW BNE L12MM OD27MM ST SONU FULL THRD T8 DRV - [id number]  SCREW BNE L12MM OD27MM ST SONU FULL THRD T8 DR  [employer]  Right 1 Implanted   SCREW BNE L16MM OD27MM ST SONU FULL THRD T8 DRV - [id number]  SCREW BNE L16MM OD27MM ST SONU FULL THRD T8 DRV  [employer]  Right 1 Implanted   SCREW BNE L18MM OD27MM ST SONU FULL THRD T8 DRV - [id number]  SCREW BNE L18MM OD27MM ST SONU FULL THRD T8 DR  [employer]  Right 4 Implanted   SCREW BNE L20MM OD27MM ST SONU FULL THRD T8 DRV - [id number]  SCREW BNE L20MM OD27MM ST SONU FULL THRD T8 DR  [employer]-  Right 2 Implanted   SCREW BNE L12MM OD27MM ST FULL THRD T8 DRV - [id number]  SCREW BNE L12MM OD27MM ST FULL THRD T8 DR  [employer]-  Right 1 Implanted   SCREW BNE L14MM OD27MM ST FULL THRD T8 DRV - [id number]  SCREW BNE L14MM OD27MM ST FULL THRD T8 DR  [employer]  Right 1 Implanted         Drains: * No LDAs found *    Findings: Same    Electronically signed by Get Graham MD on 12/6/2022 at 6:51 AM

## 2022-12-06 NOTE — TELEPHONE ENCOUNTER
General Question     Subject: PAPERWORK FOR JOB  Patient and /or Facility Request: Bijan Gooden  Contact Number: [phone]      I GAVE THE PT THE FAX # SO SHE CAN FAX IN HER PAPERWORK FOR HER JOB. SHE WANTED THE OFFICE TO BE AWARE.
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
## 2022-12-06 NOTE — TELEPHONE ENCOUNTER
Auth: NPR  Date: 12/05/22  Reference # [id number]  Spoke with: Kyler Lynn  Type of SX: Outpatient  Location: W  CPT: 73864   DX: S52.501A  SX area:  Rt wrist  Insurance: TA

## 2022-12-06 NOTE — TELEPHONE ENCOUNTER
General Question     Subject: PATIENT STATES SHE HAS PAPERWORK NEEDING TO BE FILLED OUT FOR RETURN TO WORK. PATIENT REQUESTING TO EMAIL PAPERWORK IF POSSIBLE. PLEASE ADVISE.    Patient Zehra Thomas  Contact Number: [phone]

## 2022-12-07 NOTE — TELEPHONE ENCOUNTER
Patient has not sent the fax over yet. Gave fax number for [address] and notified back in office on Friday. Will check on Friday as patient states she will fax it tonight.

## 2022-12-07 NOTE — TELEPHONE ENCOUNTER
General Question     Subject: REQ CALLBACK  Patient and /or Facility Request: Marlen Cummings  Contact Number:  [phone]    PT REQ CALLBACK AT NUMBER LISTED. REQ TO SPEAK TO SOMEONE IN CLINIC TO CONFIRM PAPERS SHE IS FAXING OVER FOR RTW ARE RECVD AND SIGNED BY PROVIDER. CONTACT PT AT NUMBER LISTED TO ADVISE.

## 2022-12-12 NOTE — TELEPHONE ENCOUNTER
General Question     Subject: REQ CALLBACK  Patient and /or Facility Request: Low Fregoso  Contact Number:  [phone]    REQ CALLBACK ASAP ABOUT RTW PAPERS. CONTACT PT AT NUMBER LISTED TO DISCUSS AND ADVISE.

## 2022-12-19 ENCOUNTER — TELEPHONE (OUTPATIENT)
Dept: ORTHOPEDIC SURGERY | Age: 27
End: 2022-12-19

## 2022-12-21 ENCOUNTER — OFFICE VISIT (OUTPATIENT)
Dept: ORTHOPEDIC SURGERY | Age: 27
End: 2022-12-21

## 2022-12-21 VITALS — BODY MASS INDEX: 28.93 KG/M2 | HEIGHT: 55 IN | WEIGHT: 125 LBS

## 2022-12-21 DIAGNOSIS — S52.591A OTHER CLOSED FRACTURE OF DISTAL END OF RIGHT RADIUS, INITIAL ENCOUNTER: Primary | ICD-10-CM

## 2022-12-21 PROCEDURE — APPNB15 APP NON BILLABLE TIME 0-15 MINS: Performed by: NURSE PRACTITIONER

## 2022-12-21 PROCEDURE — 99024 POSTOP FOLLOW-UP VISIT: CPT | Performed by: NURSE PRACTITIONER

## 2022-12-21 NOTE — PROGRESS NOTES
primary care physician for evaluation for osteoporosis, including consideration for DEXA scanning, if this is felt to be clinically indicated. The patient is advised to contact the primary care physician to follow-up for further evaluation. Procedures    Yoko Abreu Titan Wrist Long Forearm Brace     Patient was prescribed a Yoko Abreu Titan Wrist and Forearm Brace. The right wrist will require stabilization / immobilization from this semi-rigid / rigid orthosis to improve their function. The orthosis will assist in protecting the affected area, provide functional support and facilitate healing. The patient was educated and fit by a healthcare professional with expert knowledge and specialization in brace application while under the direct supervision of the treating physician. Verbal and written instructions for the use of and application of this item were provided. They were instructed to contact the office immediately should the brace result in increased pain, decreased sensation, increased swelling or worsening of the condition.          Asiya Guzman, APRN - CNP

## 2022-12-28 ENCOUNTER — OFFICE VISIT (OUTPATIENT)
Dept: INTERNAL MEDICINE CLINIC | Age: 27
End: 2022-12-28
Payer: COMMERCIAL

## 2022-12-28 VITALS
HEIGHT: 55 IN | HEART RATE: 113 BPM | SYSTOLIC BLOOD PRESSURE: 122 MMHG | OXYGEN SATURATION: 99 % | BODY MASS INDEX: 27.4 KG/M2 | WEIGHT: 118.4 LBS | DIASTOLIC BLOOD PRESSURE: 80 MMHG

## 2022-12-28 DIAGNOSIS — E11.65 TYPE 2 DIABETES MELLITUS WITH HYPERGLYCEMIA, WITHOUT LONG-TERM CURRENT USE OF INSULIN (HCC): ICD-10-CM

## 2022-12-28 PROCEDURE — 99214 OFFICE O/P EST MOD 30 MIN: CPT | Performed by: NURSE PRACTITIONER

## 2022-12-28 PROCEDURE — 3046F HEMOGLOBIN A1C LEVEL >9.0%: CPT | Performed by: NURSE PRACTITIONER

## 2022-12-28 RX ORDER — BLOOD-GLUCOSE METER
1 KIT MISCELLANEOUS
Qty: 1 KIT | Refills: 0 | Status: SHIPPED | OUTPATIENT
Start: 2022-12-28

## 2022-12-28 RX ORDER — INSULIN GLARGINE 100 [IU]/ML
INJECTION, SOLUTION SUBCUTANEOUS NIGHTLY
Qty: 5 ADJUSTABLE DOSE PRE-FILLED PEN SYRINGE | Refills: 3 | Status: CANCELLED | OUTPATIENT
Start: 2022-12-28

## 2022-12-28 RX ORDER — LANCETS 30 GAUGE
1 EACH MISCELLANEOUS
Qty: 100 EACH | Refills: 2 | Status: SHIPPED | OUTPATIENT
Start: 2022-12-28

## 2022-12-28 RX ORDER — INSULIN GLARGINE 100 [IU]/ML
15 INJECTION, SOLUTION SUBCUTANEOUS NIGHTLY
Qty: 5 ADJUSTABLE DOSE PRE-FILLED PEN SYRINGE | Refills: 3 | Status: SHIPPED | OUTPATIENT
Start: 2022-12-28

## 2022-12-28 RX ORDER — GLUCOSAMINE HCL/CHONDROITIN SU 500-400 MG
1 CAPSULE ORAL
Qty: 100 STRIP | Refills: 2 | Status: SHIPPED | OUTPATIENT
Start: 2022-12-28

## 2022-12-28 ASSESSMENT — ENCOUNTER SYMPTOMS
RESPIRATORY NEGATIVE: 1
GASTROINTESTINAL NEGATIVE: 1

## 2022-12-28 NOTE — PATIENT INSTRUCTIONS
Please let me know if your new insurance does not cover your medications. Make sure to eat a low carb diet. Exercise. Continue taking metformin.

## 2022-12-28 NOTE — PROGRESS NOTES
Patient: Ольга Mcwilliams is a 32 y.o. female who presents today with the following Chief Complaint(s):  Chief Complaint   Patient presents with    Follow-up     1 mth f/u       HPI  Presents to the office for diabetes follow-up. Has not been able to get any diabetic supplies or medication due to insurance not covering any of her prescriptions. Has only been taking metformin. Will have new insurance on January 1 through her employer. Diabetes  She presents for her follow-up diabetic visit. She has type 2 diabetes mellitus. Her disease course has been worsening. There are no diabetic associated symptoms. Risk factors for coronary artery disease include diabetes mellitus. Current diabetic treatment includes diet, insulin injections and oral agent (monotherapy). Meal planning includes avoidance of concentrated sweets. (A1c is 11. 6) An ACE inhibitor/angiotensin II receptor blocker is not being taken. Current Outpatient Medications   Medication Sig Dispense Refill    glucose monitoring (FREESTYLE FREEDOM) kit 1 kit by Does not apply route 4 times daily (before meals and nightly) 1 kit 0    Lancets MISC 1 each by Does not apply route 4 times daily (before meals and nightly) 100 each 2    Insulin Pen Needle 31G X 5 MM MISC 1 each by Does not apply route daily 100 each 3    blood glucose monitor strips 1 strip by Other route 4 times daily (before meals and nightly) 100 strip 2    insulin glargine (LANTUS SOLOSTAR) 100 UNIT/ML injection pen Inject 15 Units into the skin nightly 5 Adjustable Dose Pre-filled Pen Syringe 3    metFORMIN (GLUCOPHAGE) 500 MG tablet Take 1 tablet by mouth 2 times daily (with meals) 60 tablet 1    ondansetron (ZOFRAN) 4 MG tablet Take by mouth every 8 hours as needed (Patient not taking: No sig reported)       No current facility-administered medications for this visit.        Patient's past medical history, surgical history, family history, medications,  and allergies  were all reviewed and updated as appropriate today. Patient Active Problem List   Diagnosis    Closed fracture of right distal radius    Current smoker     Past Medical History:   Diagnosis Date    Type 2 diabetes mellitus without complication ([facility] [address]       Past Surgical History:   Procedure Laterality Date    FOREARM SURGERY Right 12/5/2022    OPEN REDUCTION INTERNAL FIXATION RIGHT DISTAL RADIUS performed by Fidencio Lerner MD at [address] [place] [address] History   Problem Relation Age of Onset    High Blood Pressure Mother     No Known Problems Father       No Known Allergies      Review of Systems   Constitutional: Negative. HENT: Negative. Respiratory: Negative. Cardiovascular: Negative. Gastrointestinal: Negative. Genitourinary: Negative. Musculoskeletal:  Positive for arthralgias (Had repair of right wrist fracture. Has follow-up appointment on February 1 with orthopedics. ). Skin: Negative. Neurological: Negative. Psychiatric/Behavioral: Negative. Vitals:    12/28/22 1324   BP: 122/80   Site: Left Upper Arm   Position: Sitting   Cuff Size: Small Adult   Pulse: (!) 113   SpO2: 99%   Weight: 118 lb 6.4 oz (53.7 kg)   Height: 4' 7\" (1.397 m)     Physical Exam  Constitutional:       General: She is not in acute distress. Appearance: Normal appearance. She is not ill-appearing, toxic-appearing or diaphoretic. HENT:      Head: Normocephalic. Eyes:      Conjunctiva/sclera: Conjunctivae normal.   Cardiovascular:      Rate and Rhythm: Regular rhythm. Tachycardia present. Pulses: Normal pulses. Heart sounds: Normal heart sounds. No murmur heard. No friction rub. No gallop. Pulmonary:      Effort: Pulmonary effort is normal. No respiratory distress. Breath sounds: Normal breath sounds. No stridor. No wheezing, rhonchi or rales. Abdominal:      Palpations: Abdomen is soft. Musculoskeletal:         General: Normal range of motion.       Cervical back: Normal range of motion and neck supple. No rigidity. Right lower leg: No edema. Left lower leg: No edema. Skin:     General: Skin is warm and dry. Neurological:      Mental Status: She is alert and oriented to person, place, and time. Psychiatric:         Mood and Affect: Mood normal.         Behavior: Behavior normal.         Thought Content: Thought content normal.         Judgment: Judgment normal.          Assessment/Plan:  1. Type 2 diabetes mellitus with hyperglycemia, without long-term current use of insulin (HCC)  -No samples available. -Advised to follow-up immediately if she is unable to get medications once insurance kicks in.  - glucose monitoring (FREESTYLE FREEDOM) kit; 1 kit by Does not apply route 4 times daily (before meals and nightly)  Dispense: 1 kit; Refill: 0  - Lancets MISC; 1 each by Does not apply route 4 times daily (before meals and nightly)  Dispense: 100 each; Refill: 2  - Insulin Pen Needle 31G X 5 MM MISC; 1 each by Does not apply route daily  Dispense: 100 each; Refill: 3  - blood glucose monitor strips; 1 strip by Other route 4 times daily (before meals and nightly)  Dispense: 100 strip; Refill: 2  - insulin glargine (LANTUS SOLOSTAR) 100 UNIT/ML injection pen; Inject 15 Units into the skin nightly  Dispense: 5 Adjustable Dose Pre-filled Pen Syringe; Refill: 3      Return in about 2 months (around 2/28/2023), or if symptoms worsen or fail to improve.

## 2022-12-29 ENCOUNTER — TELEPHONE (OUTPATIENT)
Dept: ORTHOPEDIC SURGERY | Age: 27
End: 2022-12-29

## 2022-12-29 NOTE — TELEPHONE ENCOUNTER
Return call to patient. Patient states her work sent the paperwork over but she is unsure where her work sent it to. Gave her the fax for medical records to confirm they sent the paperwork to the correct number. Upon return call, patient will verify fax number the paperwork was sent. Should be notified there may be delays in paperwork due to the holidays.

## 2022-12-29 NOTE — TELEPHONE ENCOUNTER
Question     Subject: QS:XCDPU TERM DISABILITY FORMS FROM [employer]  Patient Request: Leigh Ann Reyes  Contact Number: [phone]      PATIENT IS CALLING TO CONFIRM THAT DR. BUNCH DID RECEIVED HER SHORT TERM DISABILITY FORMS FROM [employer] TO BE FILLED OUT FOR PATIENT'S PAY WHILE SHE IS OFF. PLEASE RETURN CALL TO THE ABOVE NUMBER.

## 2023-01-03 ENCOUNTER — TELEPHONE (OUTPATIENT)
Dept: ORTHOPEDIC SURGERY | Age: 28
End: 2023-01-03

## 2023-01-04 ENCOUNTER — TELEPHONE (OUTPATIENT)
Dept: ORTHOPEDIC SURGERY | Age: 28
End: 2023-01-04

## 2023-01-04 NOTE — TELEPHONE ENCOUNTER
Medical records completed paperwork and faxed to [place]. Called patient to notify and left detailed message on voicemail stating paperwork was completed.

## 2023-01-04 NOTE — TELEPHONE ENCOUNTER
Faxed medical records Abdelrahman Westfall) for 12/5/22 to present to [facility] @ [phone]    Claim # [id number]

## 2023-01-05 ENCOUNTER — HOSPITAL ENCOUNTER (OUTPATIENT)
Dept: OCCUPATIONAL THERAPY | Age: 28
Setting detail: THERAPIES SERIES
Discharge: HOME | End: 2023-01-05
Payer: COMMERCIAL

## 2023-01-05 PROCEDURE — 97110 THERAPEUTIC EXERCISES: CPT

## 2023-01-05 PROCEDURE — 97165 OT EVAL LOW COMPLEX 30 MIN: CPT

## 2023-01-06 NOTE — PROGRESS NOTES
Occupational Therapy  Occupational Therapy Initial Assessment  Date:  2023    Patient Name: Severiano Olmstead  MRN: [id number]     :  1995     Treatment Diagnosis: decreased ROM and strength of R hand and wrist    Restrictions:  Position Activity Restriction  Other position/activity restrictions: No heavy lifting/impact R hand  Subjective:   General  Chart Reviewed: Yes  Patient assessed for rehabilitation services?: Yes  Additional Pertinent Hx: Pt is a 32 y.o female who is s/p OPEN REDUCTION INTERNAL FIXATION RIGHT DISTAL RADIUS, 2022. Pt was placed in a Yoko Abreu Titan Wrist Long Forearm Brace. Pt now presents to OP OT with complaints of ongoing R wrist deficits. Self reported health status[de-identified] Good  History obtained from[de-identified] Patient  Family/Caregiver Present: No  Diagnosis: S52.591A (ICD-10-CM) - Other closed fracture of distal end of right radius, initial encounter  Referring Provider (secondary): NGHIA Pedraza - CNP  OT Visit Information  Onset Date: 23  OT Insurance Information: [employer]  Total # of Visits Approved:  (MED NEC)     Social History:   Social History  Lives With: Spouse (nephew)  Type of Home: House  Functional Status:  Functional Status  Prior level of function: Independent  Occupation: Full time employment  Type of Occupation: [employer]   Job Duties: Moderate lifting  ADL Assistance: Independent  Ambulation Assistance: Independent  Transfer Assistance: Independent  Active : Yes (not since injury)     Objective:              Activity Tolerance  Activity Tolerance: Patient tolerated evaluation without incident              Cognition  Overall Cognitive Status: WFL                 LUE AROM (degrees)  LUE AROM : WFL  Left Hand AROM (degrees)  Left Hand AROM: WFL  RUE AROM (degrees)  R Forearm Pron 0-90: 10  R Forearm Supination  0-90: 10  R Wrist Flexion 0-80: 25  R Wrist Extension 0-70: 0  R Wrist Radial Deviation 0-20: 0  R Wrist Ulnar Deviation 0-45: 0  Right Hand AROM (degrees)  Right Hand General AROM: unable to complete full fist ~7 cm of 3rd digit to palm  LUE Strength  Gross LUE Strength: WFL  L Wrist Flex: 5/5  L Wrist Ext: 5/5  L Hand General: 5/5  RUE Strength  Gross RUE Strength: Exceptions to [facility]  R Wrist Flex: 2-/5  R Wrist Ext: 2-/5  R Hand General: 2-/5     Hand Dominance  Hand Dominance: Left  Left Hand Strength -  (lbs)  Handle Setting 2: NA  Right Hand Strength -  (lbs)  Handle Setting 2: NA             Assessment:    Assessment  Performance deficits / Impairments: Decreased ROM; Decreased strength;Decreased high-level IADLs;Decreased sensation  Assessment: Pt is a 32 y.o female who is s/p OPEN REDUCTION INTERNAL FIXATION RIGHT DISTAL RADIUS, 12/5/2022. Pt was placed in a [place]omean Wrist Long Forearm Brace. Pt now presents to OP OT with complaints of ongoing R wrist deficits. Pt demonstrates significant decreased ROM including R wrist and hand/fingers. Pt with minimal noted wrist or finger AROM. Pt noted to have decreased R wrist strength and unable to test  strength due to inability to complete fist. Pt denies any sensation deficits. Pt reports 0/10 pain at rest and up to 6/10 pain with use of R hand. Pt reports not wearing brace \"much\" and is not present for evaluation. Despite ongoing deficits, pt reports completing ADLs Ind with increased difficulty. Pt will benefit from skilled OT services at this time at a frequency of 2x a week for 4 weeks.   Treatment Diagnosis: decreased ROM and strength of R hand and wrist  Prognosis: Good  Decision Making: Low Complexity  Occupational Profile/History : Low  Assessment(s) that identifies: Low  Assistance Modification: Low  Activity Tolerance  Activity Tolerance: Patient tolerated evaluation without incident       Plan:    Occupational Therapy Plan  Current Treatment Recommendations: Strengthening, ROM, Modalities, Patient/Caregiver education & training, Safety education & training, Pain management    G-Code:     OutComes Score:                 QuickDASH Total Score: 28 (01/05/23 1509)       QuickDASH Disability/Symptom Score : 38.64 % (01/05/23 1509)                        Goals:     Patient Goals      Patient goals  increase use of R hand/wrist   Short Term Goals     Time Frame for Short Term Goals 4-6 weeks   Short Term Goal 1 Pt will be Ind with HEP and report completing daily   STG 1 Current Status: --   STG Goal 1 Status: --   Short Term Goal 2 Pt will decrease QuickDASH score by 5 points for increased ability to complete ADLs/IADLs   STG 2 Current Status: --   STG Goal 2 Status: --   Short Term Goal 3 Pt will increase R finger ROM in order to be able to complete light fist and test  strength   STG 3 Current Status: --   STG Goal 3 Status: --   Short Term Goal 4 Pt will increase R wrist flexion by 15 degrees for increased ability to complete ADLs   STG 4 Current Status: --   STG Goal 4 Status: --   Short Term Goal 5 Pt will incease R wrist extension by 15 degrees for increased ability to complete work tasks   STG 5 Current Status: --   STG Goal 5 Status:           Therapy Time:   Individual Concurrent Group Co-treatment   Time In 1505         Time Out 1550         Minutes 45         Timed Code Treatment Minutes: 30 Minutes (15 minute eval)       VINNIE El/JQAUAN

## 2023-01-06 NOTE — PROGRESS NOTES
Occupational Therapy     Outpatient Occupational Therapy  [] [facility] [address] MATEUS VALLE   Phone: [phone]   Fax: [phone]   [x] [facility]  Phone: [phone]   Fax: [phone]  [] Chanetta Leyden   Phone: [phone]   Fax: [phone]      To:  SHAR Winslow      Patient: Jaja Holm  : 1995  MRN: [id number]  Evaluation Date: 2023      Diagnosis Information:  Diagnosis: S52.591A (ICD-10-CM) - Other closed fracture of distal end of right radius, initial encounter   OT Insurance Information: [address]     Occupational Therapy Certification Form  Dear SHAR Winslow,   The following patient has been evaluated for occupational therapy services and for therapy to continue, Medicare requires monthly physician review of the treatment plan. Please review the attached evaluation and/or summary of the patient's plan of care, and verify that you agree therapy should continue by signing the attached document and sending it back to our office.     Plan of Care/Treatment to date:  [x] Therapeutic Exercise   [x] Modalities:  [x] Therapeutic Activity    [] Ultrasound  [] Electrical Stimulation   [] Activities of Daily Living    [] Fluidotherapy [] Kinesiotaping  [] Neuromuscular Re-education   [] Iontophoresis [x] Coldpack/hotpack   [x] Instruction in HEP     [] Contrast Bath  [x] Manual Therapy     Other: Paraffin   [] Aquatic Therapy      [x]       Frequency/Duration:  # Days per week: [] 1 day # Weeks: [] 1 week [] 5 weeks      [x] 2 days   [] 2 weeks [] 6 weeks     [] 3 days   [] 3 weeks [] 7 weeks     [] 4 days   [x] 4 weeks [] 8 weeks    Rehab Potential: [] excellent [x] good [] fair  [] poor     Patient Goals      Patient goals  increase use of R hand/wrist   Short Term Goals     Time Frame for Short Term Goals 4-6 weeks   Short Term Goal 1 Pt will be Ind with HEP and report completing daily   STG 1 Current Status: --   STG Goal 1 Status: --   Short Term Goal 2 Pt will decrease QuickDASH score by 5 points for increased ability to complete ADLs/IADLs   STG 2 Current Status: --   STG Goal 2 Status: --   Short Term Goal 3 Pt will increase R finger ROM in order to be able to complete light fist and test  strength   STG 3 Current Status: --   STG Goal 3 Status: --   Short Term Goal 4 Pt will increase R wrist flexion by 15 degrees for increased ability to complete ADLs   STG 4 Current Status: --   STG Goal 4 Status: --   Short Term Goal 5 Pt will incease R wrist extension by 15 degrees for increased ability to complete work tasks   STG 5 Current Status: --   STG Goal 5 Status:          Electronically signed by:  Edith Ramon OT,OTR/L      If you have any questions or concerns, please don't hesitate to call.   Thank you for your referral.      Physician Signature:________________________________Date:__________________  By signing above, therapists plan is approved by physician

## 2023-01-06 NOTE — PROGRESS NOTES
Occupational Therapy      Occupational Therapy Daily Treatment Note    Date:  2023    Patient Name:  Colleen Suárez     :  1995  MRN: [id number]  Restrictions/Precautions:    Medical/Treatment Diagnosis Information:  Diagnosis: S52.591A (ICD-10-CM) - Other closed fracture of distal end of right radius, initial encounter  Treatment Diagnosis: decreased ROM and strength of R hand and wrist  Insurance/Certification information:  OT Insurance Information: UNITED HEALTHCARE  Physician Information:   NGHIA Henriquez CNP  Plan of care signed (Y/N):  N  Visit# / total visits:     Pain level: 0/10 at rest and up to 6/10 with use     G-Code (if applicable):      Date / Visit # G-Code Applied:  /   QuickDASH Total Score: 28 (23 1509)       QuickDASH Disability/Symptom Score : 38.64 % (23 1509)    Progress Note: [x]  Yes  []  No  Next due by: Visit #10      Subjective: Pt agreeable to OT evaluation. Objective Measures:  Eval 23    Social History:   Social History  Lives With: Spouse (nephew)  Type of Home: House  Functional Status:  Functional Status  Prior level of function: Independent  Occupation: Full time employment  Type of Occupation: [employer]   Job Duties: Moderate lifting  ADL Assistance: Independent  Ambulation Assistance: Independent  Transfer Assistance: Independent  Active : Yes (not since injury)  Objective:     Activity Tolerance  Activity Tolerance: Patient tolerated evaluation without incident  Cognition  Overall Cognitive Status: WFL  LUE AROM (degrees)  LUE AROM : WFL  Left Hand AROM (degrees)  Left Hand AROM: WFL  RUE AROM (degrees)  R Forearm Pron 0-90: 10  R Forearm Supination  0-90: 10  R Wrist Flexion 0-80: 25  R Wrist Extension 0-70: 0  R Wrist Radial Deviation 0-20: 0  R Wrist Ulnar Deviation 0-45: 0  Right Hand AROM (degrees)  Right Hand General AROM: unable to complete full fist ~7 cm of 3rd digit to palm  LUE Strength  Gross LUE Strength: WFL  L Wrist Flex: 5/5  L Wrist Ext: 5/5  L Hand General: 5/5  RUE Strength  Gross RUE Strength: Exceptions to [facility]  R Wrist Flex: 2-/5  R Wrist Ext: 2-/5  R Hand General: 2-/5  Hand Dominance  Hand Dominance: Left  Left Hand Strength -  (lbs)  Handle Setting 2: NA  Right Hand Strength -  (lbs)  Handle Setting 2: NA                                Therapeutic Exercise:   Exercise/Equipment  Resistance/Repetitions   Other comments   AROM   X10 reps    Elbow:   Flex  Ext    Forearm:  Pronation/supination    Wrist:  Flex/ext  Ulnar/radial deviation  Circumduction    Hand:  Grasp/release      Stretches   X10 reps; x30 second hold    Wrist:  Flex/ext  Ulnar/radial deviation    Hand/Finger:  Flex/ext             Home Exercise Program:    Pt issued handout for AROM and stretches    Manual Treatments:      Modalities:      Timed Code Treatment Minutes:  30 minutes (15 minute eval)    Total Treatment Minutes:  45 minutes (9727-8759)    Charges:  X1 Low Eval  X2 Ther Ex    Treatment/Activity Tolerance:     [x]  Patient tolerated treatment well []  Patient limited by fatigue    []  Patient limited by pain []  Patient limited by other medical complications   []  Other:     Assessment: Pt is a 32 y.o female who is s/p OPEN REDUCTION INTERNAL FIXATION RIGHT DISTAL RADIUS, 12/5/2022. Pt was placed in a Yoko Abreu Titan Wrist Long Forearm Brace. Pt now presents to OP OT with complaints of ongoing R wrist deficits. Pt demonstrates significant decreased ROM including R wrist and hand/fingers. Pt with minimal noted wrist or finger AROM. Pt noted to have decreased R wrist strength and unable to test  strength due to inability to complete fist. Pt denies any sensation deficits. Pt reports 0/10 pain at rest and up to 6/10 pain with use of R hand. Pt reports not wearing brace \"much\" and is not present for evaluation. Despite ongoing deficits, pt reports completing ADLs Ind with increased difficulty.  Pt will benefit from skilled OT services at this time at a frequency of 2x a week for 4 weeks.     Prognosis: [x]  Good [x]  Fair  []  Poor    Goals:      Patient Goals       Patient goals  increase use of R hand/wrist   Short Term Goals      Time Frame for Short Term Goals 4-6 weeks   Short Term Goal 1 Pt will be Ind with HEP and report completing daily   STG 1 Current Status: --   STG Goal 1 Status: --   Short Term Goal 2 Pt will decrease QuickDASH score by 5 points for increased ability to complete ADLs/IADLs   STG 2 Current Status: --   STG Goal 2 Status: --   Short Term Goal 3 Pt will increase R finger ROM in order to be able to complete light fist and test  strength   STG 3 Current Status: --   STG Goal 3 Status: --   Short Term Goal 4 Pt will increase R wrist flexion by 15 degrees for increased ability to complete ADLs   STG 4 Current Status: --   STG Goal 4 Status: --   Short Term Goal 5 Pt will incease R wrist extension by 15 degrees for increased ability to complete work tasks   STG 5 Current Status: --   STG Goal 5 Status:        Patient Requires Follow-up:  [x]  Yes  []  No    Plan: []  Continue per plan of care []  Alter current plan (see comments)   [x]  Plan of care initiated []  Hold pending MD visit []  Discharge    Plan for Next Session:      Electronically signed by:  Sierra Louie OT,OTR/L

## 2023-01-10 ENCOUNTER — HOSPITAL ENCOUNTER (OUTPATIENT)
Dept: OCCUPATIONAL THERAPY | Age: 28
Setting detail: THERAPIES SERIES
Discharge: HOME | End: 2023-01-10
Payer: COMMERCIAL

## 2023-01-10 PROCEDURE — 97110 THERAPEUTIC EXERCISES: CPT

## 2023-01-10 PROCEDURE — 97018 PARAFFIN BATH THERAPY: CPT

## 2023-01-12 ENCOUNTER — HOSPITAL ENCOUNTER (OUTPATIENT)
Dept: OCCUPATIONAL THERAPY | Age: 28
Setting detail: THERAPIES SERIES
Discharge: HOME | End: 2023-01-12
Payer: COMMERCIAL

## 2023-01-12 NOTE — PROGRESS NOTES
Occupational Therapy      Occupational Therapy  Cancellation/No-show Note  Patient Name:  Aleida Ochoa   :  1995   Date:  2023  Cancelled visits to date: 1  No-shows to date: 0    For today's appointment patient:  [x]    Cancelled  []    Rescheduled appointment  []    No-show     Reason given by patient:  []    Patient ill  []    Conflicting appointment  []    No transportation    []    Conflict with work  []    No reason given  [x]    Other:     Comments:  Pt reports attempting to call number to report unable to attend. Pt attempting calling phone in acute office therefore no one there to answer it.      Electronically signed by:  Trevin Barrow OT, OTR/L

## 2023-01-17 ENCOUNTER — HOSPITAL ENCOUNTER (OUTPATIENT)
Dept: OCCUPATIONAL THERAPY | Age: 28
Setting detail: THERAPIES SERIES
Discharge: HOME | End: 2023-01-17
Payer: COMMERCIAL

## 2023-01-17 PROCEDURE — 97018 PARAFFIN BATH THERAPY: CPT

## 2023-01-17 PROCEDURE — 97110 THERAPEUTIC EXERCISES: CPT

## 2023-01-17 NOTE — PROGRESS NOTES
Occupational Therapy      Occupational Therapy Daily Treatment Note    Date:  2023    Patient Name:  Florin Noble     :  1995  MRN: [id number]    Restrictions/Precautions:    Medical/Treatment Diagnosis Information:  Diagnosis: S52.591A (ICD-10-CM) - Other closed fracture of distal end of right radius, initial encounter  Treatment Diagnosis: decreased ROM and strength of R hand and wrist  Insurance/Certification information:   [facility]  Physician Information:   NGHIA Zambrano - CNP  Plan of care signed (Y/N):  Y  Visit# / total visits:  3/8   Pain level: 5/10 at rest and up to 5/10 with use     G-Code (if applicable):      Date / Visit # G-Code Applied:  /   QuickDASH Total Score: 28 (23 1509)       QuickDASH Disability/Symptom Score : 38.64 % (23 1509)    Progress Note: []  Yes  [x]  No  Next due by: Visit #10      Subjective: Pt reports completing HEP 2-3x a day. Pt reports increased soreness from completing HEP. Objective Measures:  Eval 23    Social History:   Social History  Lives With: Spouse (nephew)  Type of Home: House  Functional Status:  Functional Status  Prior level of function: Independent  Occupation: Full time employment  Type of Occupation: [employer]   Job Duties: Moderate lifting  ADL Assistance: Independent  Ambulation Assistance: Independent  Transfer Assistance: Independent  Active : Yes (not since injury)  Objective:     Activity Tolerance  Activity Tolerance: Patient tolerated evaluation without incident  Cognition  Overall Cognitive Status: WFL  LUE AROM (degrees)  LUE AROM : WFL  Left Hand AROM (degrees)  Left Hand AROM: WFL  RUE AROM (degrees)  R Forearm Pron 0-90: 10  R Forearm Supination  0-90: 10  R Wrist Flexion 0-80: 25  R Wrist Extension 0-70: 0  R Wrist Radial Deviation 0-20: 0  R Wrist Ulnar Deviation 0-45: 0  Right Hand AROM (degrees)  Right Hand General AROM: unable to complete full fist ~7 cm of 3rd digit to palm  LUE Strength  Gross LUE Strength: WFL  L Wrist Flex: 5/5  L Wrist Ext: 5/5  L Hand General: 5/5  RUE Strength  Gross RUE Strength: [facility] to [facility]  R Wrist Flex: 2-/5  R Wrist Ext: 2-/5  R Hand General: 2-/5  Hand Dominance  Hand Dominance: Left  Left Hand Strength -  (lbs)  Handle Setting 2: NA  Right Hand Strength -  (lbs)  Handle Setting 2: NA                                Therapeutic Exercise:   Exercise/Equipment  Resistance/Repetitions   Other comments   AROM   X10 reps    Elbow:   Flex  Ext    Forearm:  Pronation/supination    Wrist:  Flex/ext  Ulnar/radial deviation  Circumduction    Hand:  Grasp/release      Stretches   X10-15 reps; x30 second hold    Wrist:  Flex/ext  Ulnar/radial deviation    Hand/Finger:  Flex/ext:  Full palm  Individual finger:  MCP  PIP  DIP             Home Exercise Program:    Pt issued handout for AROM and stretches    Manual Treatments:      Modalities:  x10 minutes Paraffin to R wrist, hand, and fingers; fingers with passive stretch into fist during paraffin    Timed Code Treatment Minutes:  40 minutes     Total Treatment Minutes:  45 minutes (4061-8400)    Charges:  X1 Paraffin   X2 Ther Ex    Treatment/Activity Tolerance:     [x]  Patient tolerated treatment well []  Patient limited by fatigue    []  Patient limited by pain []  Patient limited by other medical complications   []  Other:     Assessment: Pt is a 32 y.o female who is s/p OPEN REDUCTION INTERNAL FIXATION RIGHT DISTAL RADIUS, 12/5/2022. Pt was placed in a [employer] Titan Wrist Long Forearm Brace. Pt now presents to OP OT with complaints of ongoing R wrist deficits. Pt demonstrates significant decreased ROM including R wrist and hand/fingers. Pt with minimal noted wrist or finger AROM. Pt noted to have decreased R wrist strength and unable to test  strength due to inability to complete fist. Pt denies any sensation deficits. Pt reports 0/10 pain at rest and up to 6/10 pain with use of R hand.  Pt reports not wearing brace \"much\" and is not present for evaluation. Despite ongoing deficits, pt reports completing ADLs Ind with increased difficulty. Pt continues to be significantly limited this date due to ongoing stiffness in R hand and fingers. Pt tolerates paraffin to R wrist and hand well to allow for increased pressure with stretching. Pt demonstrates ability to complete self PROM well with min cues for quality of hand placement. Pt reports some increased soreness due to completing HEP regularly. Pt reports completing HEP ~2-3x a day but discussed completing 4-5x a day due to ongoing stiffness. Pt reports using heat/ice as needed. Continue with POC.      Prognosis: [x]  Good [x]  Fair  []  Poor    Goals:      Patient Goals       Patient goals  increase use of R hand/wrist   Short Term Goals      Time Frame for Short Term Goals 4-6 weeks   Short Term Goal 1 Pt will be Ind with HEP and report completing daily   STG 1 Current Status: --   STG Goal 1 Status: --   Short Term Goal 2 Pt will decrease QuickDASH score by 5 points for increased ability to complete ADLs/IADLs   STG 2 Current Status: --   STG Goal 2 Status: --   Short Term Goal 3 Pt will increase R finger ROM in order to be able to complete light fist and test  strength   STG 3 Current Status: --   STG Goal 3 Status: --   Short Term Goal 4 Pt will increase R wrist flexion by 15 degrees for increased ability to complete ADLs   STG 4 Current Status: --   STG Goal 4 Status: --   Short Term Goal 5 Pt will incease R wrist extension by 15 degrees for increased ability to complete work tasks   STG 5 Current Status: --   STG Goal 5 Status:        Patient Requires Follow-up:  [x]  Yes  []  No    Plan: [x]  Continue per plan of care []  Alter current plan (see comments)   []  Plan of care initiated []  Hold pending MD visit []  Discharge    Plan for Next Session:      Electronically signed by:  Margareth Reyes OT,OTR/L

## 2023-01-19 ENCOUNTER — HOSPITAL ENCOUNTER (OUTPATIENT)
Dept: OCCUPATIONAL THERAPY | Age: 28
Setting detail: THERAPIES SERIES
Discharge: HOME | End: 2023-01-19
Payer: COMMERCIAL

## 2023-01-19 ENCOUNTER — TELEPHONE (OUTPATIENT)
Dept: INTERNAL MEDICINE CLINIC | Age: 28
End: 2023-01-19

## 2023-01-19 PROCEDURE — 97110 THERAPEUTIC EXERCISES: CPT

## 2023-01-19 PROCEDURE — 97018 PARAFFIN BATH THERAPY: CPT

## 2023-01-19 NOTE — PROGRESS NOTES
Occupational Therapy      Occupational Therapy Daily Treatment Note    Date:  2023    Patient Name:  Brisa Phillips     :  1995  MRN: [id number]    Restrictions/Precautions:    Medical/Treatment Diagnosis Information:  Diagnosis: S52.591A (ICD-10-CM) - Other closed fracture of distal end of right radius, initial encounter  Treatment Diagnosis: decreased ROM and strength of R hand and wrist  Insurance/Certification information:   [facility]  Physician Information:   NGHIA Robbins - CNP  Plan of care signed (Y/N):  Y  Visit# / total visits:     Pain level: 0/10 at rest and up to 5/10 with use     G-Code (if applicable):      Date / Visit # G-Code Applied:  /   QuickDASH Total Score: 28 (23 1509)       QuickDASH Disability/Symptom Score : 38.64 % (23 150)    Progress Note: []  Yes  [x]  No  Next due by: Visit #10      Subjective: Pt reports completing HEP 2-3x a day. Objective Measures:  Emiliaal 23    Social History:   Social History  Lives With: Spouse (nephew)  Type of Home: House  Functional Status:  Functional Status  Prior level of function: Independent  Occupation: Full time employment  Type of Occupation: [employer]   Job Duties: Moderate lifting  ADL Assistance: Independent  Ambulation Assistance: Independent  Transfer Assistance: Independent  Active : Yes (not since injury)  Objective:     Activity Tolerance  Activity Tolerance: Patient tolerated evaluation without incident  Cognition  Overall Cognitive Status: WFL  LUE AROM (degrees)  LUE AROM : WFL  Left Hand AROM (degrees)  Left Hand AROM: WFL  RUE AROM (degrees)  R Forearm Pron 0-90: 10  R Forearm Supination  0-90: 10  R Wrist Flexion 0-80: 25  R Wrist Extension 0-70: 0  R Wrist Radial Deviation 0-20: 0  R Wrist Ulnar Deviation 0-45: 0  Right Hand AROM (degrees)  Right Hand General AROM: unable to complete full fist ~7 cm of 3rd digit to palm  LUE Strength  Gross LUE Strength: WFL  L Wrist Flex: 5/5  L Wrist Ext: 5/5  L Hand General: 5/5  RUE Strength  Gross RUE Strength: Exceptions to [facility]  R Wrist Flex: 2-/5  R Wrist Ext: 2-/5  R Hand General: 2-/5  Hand Dominance  Hand Dominance: Left  Left Hand Strength -  (lbs)  Handle Setting 2: NA  Right Hand Strength -  (lbs)  Handle Setting 2: NA                                Therapeutic Exercise:   Exercise/Equipment  Resistance/Repetitions   Other comments   AROM   X10 reps    Elbow:   Flex  Ext    Forearm:  Pronation/supination    Wrist:  Flex/ext  Ulnar/radial deviation  Circumduction    Hand:  Grasp/release      Stretches   X10-15 reps; x30 second hold    Wrist:  Flex/ext  Ulnar/radial deviation    Hand/Finger:  Flex/ext:  Full palm  Individual finger:  MCP  PIP  DIP             Home Exercise Program:    Pt issued handout for AROM and stretches    Manual Treatments:      Modalities:  x10 minutes Paraffin to R wrist, hand, and fingers; fingers with passive stretch into fist during paraffin    Timed Code Treatment Minutes:  40 minutes     Total Treatment Minutes:  45 minutes (6491-3899)    Charges:  X1 Paraffin   X2 Ther Ex    Treatment/Activity Tolerance:     [x]  Patient tolerated treatment well []  Patient limited by fatigue    []  Patient limited by pain []  Patient limited by other medical complications   []  Other:     Assessment: Pt is a 32 y.o female who is s/p OPEN REDUCTION INTERNAL FIXATION RIGHT DISTAL RADIUS, 12/5/2022. Pt was placed in a [employer] Wrist Long Forearm Brace. Pt now presents to OP OT with complaints of ongoing R wrist deficits. Pt demonstrates significant decreased ROM including R wrist and hand/fingers. Pt with minimal noted wrist or finger AROM. Pt noted to have decreased R wrist strength and unable to test  strength due to inability to complete fist. Pt denies any sensation deficits. Pt reports 0/10 pain at rest and up to 6/10 pain with use of R hand.  Pt reports not wearing brace \"much\" and is not present for evaluation. Despite ongoing deficits, pt reports completing ADLs Ind with increased difficulty. Pt continues to be significantly limited this date due to ongoing stiffness in R hand and fingers. Pt tolerates paraffin to R wrist and hand well to allow for increased pressure with stretching. Pt demonstrates ability to complete self PROM well with min cues for quality of hand placement. Pt reports some increased soreness due to completing HEP regularly. Pt reports completing HEP ~3x a day but discussed completing 4-5x a day due to ongoing stiffness. Pt reports using heat/ice as needed. Pt verbalized understanding. Continue with POC.      Prognosis: [x]  Good [x]  Fair  []  Poor    Goals:      Patient Goals       Patient goals  increase use of R hand/wrist   Short Term Goals      Time Frame for Short Term Goals 4-6 weeks   Short Term Goal 1 Pt will be Ind with HEP and report completing daily   STG 1 Current Status: --   STG Goal 1 Status: --   Short Term Goal 2 Pt will decrease QuickDASH score by 5 points for increased ability to complete ADLs/IADLs   STG 2 Current Status: --   STG Goal 2 Status: --   Short Term Goal 3 Pt will increase R finger ROM in order to be able to complete light fist and test  strength   STG 3 Current Status: --   STG Goal 3 Status: --   Short Term Goal 4 Pt will increase R wrist flexion by 15 degrees for increased ability to complete ADLs   STG 4 Current Status: --   STG Goal 4 Status: --   Short Term Goal 5 Pt will incease R wrist extension by 15 degrees for increased ability to complete work tasks   STG 5 Current Status: --   STG Goal 5 Status:        Patient Requires Follow-up:  [x]  Yes  []  No    Plan: [x]  Continue per plan of care []  Alter current plan (see comments)   []  Plan of care initiated []  Hold pending MD visit []  Discharge    Plan for Next Session:      Electronically signed by:  Margareth Reyes OT,OTR/L

## 2023-01-20 RX ORDER — CEPHALEXIN 500 MG/1
CAPSULE ORAL
Qty: 20 CAPSULE | Refills: 0 | OUTPATIENT
Start: 2023-01-20

## 2023-01-20 NOTE — TELEPHONE ENCOUNTER
Medication:   Requested Prescriptions     Pending Prescriptions Disp Refills    metFORMIN (GLUCOPHAGE) 500 MG tablet 60 tablet 1     Sig: Take 1 tablet by mouth 2 times daily (with meals)        Last Filled:  12/28/22    Patient Phone Number: [phone] (home)     Last appt: 12/28/2022   Next appt: 2/28/2023    Last OARRS: No flowsheet data found.

## 2023-01-26 ENCOUNTER — HOSPITAL ENCOUNTER (OUTPATIENT)
Dept: OCCUPATIONAL THERAPY | Age: 28
Setting detail: THERAPIES SERIES
Discharge: HOME | End: 2023-01-26
Payer: COMMERCIAL

## 2023-01-26 DIAGNOSIS — E11.65 TYPE 2 DIABETES MELLITUS WITH HYPERGLYCEMIA, WITHOUT LONG-TERM CURRENT USE OF INSULIN (HCC): ICD-10-CM

## 2023-01-26 PROCEDURE — 97110 THERAPEUTIC EXERCISES: CPT

## 2023-01-26 RX ORDER — INSULIN GLARGINE 100 [IU]/ML
15 INJECTION, SOLUTION SUBCUTANEOUS NIGHTLY
Qty: 5 ADJUSTABLE DOSE PRE-FILLED PEN SYRINGE | Refills: 3 | Status: CANCELLED | OUTPATIENT
Start: 2023-01-26

## 2023-01-26 NOTE — TELEPHONE ENCOUNTER
Medication:   Requested Prescriptions     Pending Prescriptions Disp Refills    insulin glargine (LANTUS SOLOSTAR) 100 UNIT/ML injection pen 5 Adjustable Dose Pre-filled Pen Syringe 3     Sig: Inject 15 Units into the skin nightly        Last Filled: 12/28/22    Patient Phone Number: [phone] (home)     Last appt: 12/28/2022   Next appt: 2/28/2023     ALTERNATIVE REQUESTED: INSURANCE WANTS SEMGLEE 100 UNIT ML PEN

## 2023-01-26 NOTE — PROGRESS NOTES
6/17/2019       RE: Vinh Mccarty  [address]  [facility] [address]     Dear Colleague,    Thank you for referring your patient, Vinh Mccarty, to the [facility] NEUROSURGERY at [facility]. Please see a copy of my visit note below.    See dictated note.    Again, thank you for allowing me to participate in the care of your patient.      Sincerely,    Matthew Fox MD     Occupational Therapy      Occupational Therapy Daily Treatment Note    Date:  2023    Patient Name:  Elida Garcia     :  1995  MRN: [id number]    Restrictions/Precautions:    Medical/Treatment Diagnosis Information:  Diagnosis: S52.591A (ICD-10-CM) - Other closed fracture of distal end of right radius, initial encounter  Treatment Diagnosis: decreased ROM and strength of R hand and wrist  Insurance/Certification information:   [facility]  Physician Information:   NGHIA Oshea - CNP  Plan of care signed (Y/N):  Y  Visit# / total visits:     Pain level: 0/10 at rest and up to 5/10 with use     G-Code (if applicable):      Date / Visit # G-Code Applied:  /   QuickDASH Total Score: 28 (23 1509)       QuickDASH Disability/Symptom Score : 38.64 % (23 150)    Progress Note: []  Yes  [x]  No  Next due by: Visit #10      Subjective: Pt reports completing HEP ~3x a day. Uses heat and ice daily. Objective Measures:  Eval 23    Social History:   Social History  Lives With: Spouse (nephew)  Type of Home: House  Functional Status:  Functional Status  Prior level of function: Independent  Occupation: Full time employment  Type of Occupation: [employer]   Job Duties: Moderate lifting  ADL Assistance: Independent  Ambulation Assistance: Independent  Transfer Assistance: Independent  Active : Yes (not since injury)  Objective:     Activity Tolerance  Activity Tolerance: Patient tolerated evaluation without incident  Cognition  Overall Cognitive Status: WFL  LUE AROM (degrees)  LUE AROM : WFL  Left Hand AROM (degrees)  Left Hand AROM: WFL  RUE AROM (degrees)  R Forearm Pron 0-90: 10  R Forearm Supination  0-90: 10  R Wrist Flexion 0-80: 25  R Wrist Extension 0-70: 0  R Wrist Radial Deviation 0-20: 0  R Wrist Ulnar Deviation 0-45: 0  Right Hand AROM (degrees)  Right Hand General AROM: unable to complete full fist ~7 cm of 3rd digit to palm  LUE Strength  Gross LUE Strength: WFL  L Wrist Flex: 5/5  L Wrist Ext: 5/5  L Hand General: 5/5  RUE Strength  Gross RUE Strength: [facility] to [facility]  R Wrist Flex: 2-/5  R Wrist Ext: 2-/5  R Hand General: 2-/5  Hand Dominance  Hand Dominance: Left  Left Hand Strength -  (lbs)  Handle Setting 2: NA  Right Hand Strength -  (lbs)  Handle Setting 2: NA                                Therapeutic Exercise:   Exercise/Equipment  Resistance/Repetitions   Other comments   AROM   X10 reps    Elbow:   Flex  Ext    Forearm:  Pronation/supination    Wrist:  Flex/ext  Ulnar/radial deviation  Circumduction    Hand:  Grasp/release   Increased AROM noted in fingers/wrist   Stretches   X10-15 reps; x30 second hold    Wrist:  Flex/ext  Ulnar/radial deviation    Hand/Finger:  Flex/ext:  Full palm  Individual finger:  MCP  PIP  DIP   Improved PROM          Home Exercise Program:    Pt issued handout for AROM and stretches    Manual Treatments:      Modalities:  x10 minutes Paraffin to R wrist, hand, and fingers; fingers with passive stretch into fist during paraffin    Timed Code Treatment Minutes:  40 minutes     Total Treatment Minutes:  45 minutes (0606-6640)    Charges:  X1 Paraffin   X2 Ther Ex    Treatment/Activity Tolerance:     [x]  Patient tolerated treatment well []  Patient limited by fatigue    []  Patient limited by pain []  Patient limited by other medical complications   []  Other:     Assessment: Pt is a 32 y.o female who is s/p OPEN REDUCTION INTERNAL FIXATION RIGHT DISTAL RADIUS, 12/5/2022. Pt was placed in a [employer] Titan Wrist Long Forearm Brace. Pt now presents to OP OT with complaints of ongoing R wrist deficits. Pt demonstrates significant decreased ROM including R wrist and hand/fingers. Pt with minimal noted wrist or finger AROM. Pt noted to have decreased R wrist strength and unable to test  strength due to inability to complete fist. Pt denies any sensation deficits.  Pt reports 0/10 pain at rest and up to 6/10 pain with use of R hand. Pt reports not wearing brace \"much\" and is not present for evaluation. Despite ongoing deficits, pt reports completing ADLs Ind with increased difficulty. Pt continues to be significantly limited this date due to ongoing stiffness in R hand and fingers. Pt tolerates paraffin to R wrist and hand well to allow for increased pressure with stretching. Pt demonstrates ability to complete self PROM well with no cues. Pt tolerating increased pressure with PROM this date with both fingers and wrist. Pt demonstrates improved PROM and AROM of wrist and fingers. Pt reports some increased soreness due to completing HEP regularly. Pt reports completing HEP ~3x a day but discussed completing 4-5x a day due to ongoing stiffness. Pt reports using heat/ice as needed. Pt verbalized understanding. Continue with POC.      Prognosis: [x]  Good [x]  Fair  []  Poor    Goals:      Patient Goals       Patient goals  increase use of R hand/wrist   Short Term Goals      Time Frame for Short Term Goals 4-6 weeks   Short Term Goal 1 Pt will be Ind with HEP and report completing daily   STG 1 Current Status: --   STG Goal 1 Status: --   Short Term Goal 2 Pt will decrease QuickDASH score by 5 points for increased ability to complete ADLs/IADLs   STG 2 Current Status: --   STG Goal 2 Status: --   Short Term Goal 3 Pt will increase R finger ROM in order to be able to complete light fist and test  strength   STG 3 Current Status: --   STG Goal 3 Status: --   Short Term Goal 4 Pt will increase R wrist flexion by 15 degrees for increased ability to complete ADLs   STG 4 Current Status: --   STG Goal 4 Status: --   Short Term Goal 5 Pt will incease R wrist extension by 15 degrees for increased ability to complete work tasks   STG 5 Current Status: --   STG Goal 5 Status:        Patient Requires Follow-up:  [x]  Yes  []  No    Plan: [x]  Continue per plan of care []  Alter current plan (see comments)   []  Plan of care initiated []  Hold pending MD visit []  Discharge    Plan for Next Session:      Electronically signed by:  Deepa Solano OT,OTR/L

## 2023-01-27 DIAGNOSIS — E11.65 TYPE 2 DIABETES MELLITUS WITH HYPERGLYCEMIA, WITHOUT LONG-TERM CURRENT USE OF INSULIN (HCC): Primary | ICD-10-CM

## 2023-01-27 RX ORDER — INSULIN GLARGINE-YFGN 100 [IU]/ML
15 INJECTION, SOLUTION SUBCUTANEOUS NIGHTLY
Qty: 15 ML | Refills: 1 | Status: SHIPPED | OUTPATIENT
Start: 2023-01-27

## 2023-01-31 ENCOUNTER — HOSPITAL ENCOUNTER (OUTPATIENT)
Dept: OCCUPATIONAL THERAPY | Age: 28
Setting detail: THERAPIES SERIES
Discharge: HOME | End: 2023-01-31
Payer: COMMERCIAL

## 2023-01-31 PROCEDURE — 97018 PARAFFIN BATH THERAPY: CPT

## 2023-01-31 PROCEDURE — 97110 THERAPEUTIC EXERCISES: CPT

## 2023-01-31 NOTE — PROGRESS NOTES
Occupational Therapy      Occupational Therapy Daily Treatment Note    Date:  2023    Patient Name:  Vanessa Curtis     :  1995  MRN: [id number]    Restrictions/Precautions:    Medical/Treatment Diagnosis Information:  Diagnosis: S52.591A (ICD-10-CM) - Other closed fracture of distal end of right radius, initial encounter  Treatment Diagnosis: decreased ROM and strength of R hand and wrist  Insurance/Certification information:   [facility]  Physician Information:   Corrinne Piggs, APRN - CNP  Plan of care signed (Y/N):  Y  Visit# / total visits:     Pain level: 0/10 at rest and up to 5/10 with use     G-Code (if applicable):      Date / Visit # G-Code Applied:  /   QuickDASH Total Score: 28 (23 1509)       QuickDASH Disability/Symptom Score : 38.64 % (23 150)    Progress Note: []  Yes  [x]  No  Next due by: Visit #10      Subjective: Pt reports completing HEP ~3x a day. Uses heat and ice daily. Objective Measures:  Eval 23    Social History:   Social History  Lives With: Spouse (nephew)  Type of Home: House  Functional Status:  Functional Status  Prior level of function: Independent  Occupation: Full time employment  Type of Occupation: [employer]  Job Duties: Moderate lifting  ADL Assistance: Independent  Ambulation Assistance: Independent  Transfer Assistance: Independent  Active : Yes (not since injury)  Objective:     Activity Tolerance  Activity Tolerance: Patient tolerated evaluation without incident  Cognition  Overall Cognitive Status: WFL  LUE AROM (degrees)  LUE AROM : WFL  Left Hand AROM (degrees)  Left Hand AROM: WFL  RUE AROM (degrees)  R Forearm Pron 0-90: 10  R Forearm Supination  0-90: 10  R Wrist Flexion 0-80: 25  R Wrist Extension 0-70: 0  R Wrist Radial Deviation 0-20: 0  R Wrist Ulnar Deviation 0-45: 0  Right Hand AROM (degrees)  Right Hand General AROM: unable to complete full fist ~7 cm of 3rd digit to palm  LUE Strength  Gross LUE Strength: WFL  L Wrist Flex: 5/5  L Wrist Ext: 5/5  L Hand General: 5/5  RUE Strength  Gross RUE Strength: [facility]  R Wrist Flex: 2-/5  R Wrist Ext: 2-/5  R Hand General: 2-/5  Hand Dominance  Hand Dominance: Left  Left Hand Strength -  (lbs)  Handle Setting 2: NA  Right Hand Strength -  (lbs)  Handle Setting 2: NA                                Therapeutic Exercise:   Exercise/Equipment  Resistance/Repetitions   Other comments   AROM   X10 reps    Elbow:   Flex  Ext    Forearm:  Pronation/supination    Wrist:  Flex/ext  Ulnar/radial deviation  Circumduction    Hand:  Grasp/release   Increased AROM noted in fingers/wrist   Stretches   X10-15 reps; x30 second hold    Wrist:  Flex/ext  Ulnar/radial deviation    Hand/Finger:  Flex/ext:  Full palm  Individual finger:  MCP  PIP  DIP   Improved PROM          Home Exercise Program:    Pt issued handout for AROM and stretches    Manual Treatments:      Modalities:  x10 minutes Paraffin to R wrist, hand, and fingers; fingers with passive stretch into fist during paraffin    Timed Code Treatment Minutes:  40 minutes     Total Treatment Minutes:  40 minutes (5566-4845)    Charges:  X1 Paraffin   X2 Ther Ex    Treatment/Activity Tolerance:     [x]  Patient tolerated treatment well []  Patient limited by fatigue    []  Patient limited by pain []  Patient limited by other medical complications   []  Other:     Assessment: Pt is a 32 y.o female who is s/p OPEN REDUCTION INTERNAL FIXATION RIGHT DISTAL RADIUS, 12/5/2022. Pt was placed in a [employer] Titan Wrist Long Forearm Brace. Pt now presents to OP OT with complaints of ongoing R wrist deficits. Pt demonstrates significant decreased ROM including R wrist and hand/fingers. Pt with minimal noted wrist or finger AROM. Pt noted to have decreased R wrist strength and unable to test  strength due to inability to complete fist. Pt denies any sensation deficits.  Pt reports 0/10 pain at rest and up to 6/10 pain with use of R hand. Pt reports not wearing brace \"much\" and is not present for evaluation. Despite ongoing deficits, pt reports completing ADLs Ind with increased difficulty. Pt continues to be significantly limited this date due to ongoing stiffness in R hand and fingers. Pt tolerates paraffin to R wrist and hand well to allow for increased pressure with stretching. Pt demonstrates ability to complete self PROM well with no cues. Pt tolerating increased pressure with PROM this date with both fingers and wrist. Pt demonstrates improved PROM and AROM of wrist and fingers although still significantly limited in both flexion and extension. Pt reports some increased soreness due to completing HEP regularly. Pt reports completing HEP ~3x a day but discussed completing 4-5x a day due to ongoing stiffness. Pt reports using heat/ice as needed. Pt verbalized understanding. Continue with POC.      Prognosis: [x]  Good [x]  Fair  []  Poor    Goals:      Patient Goals       Patient goals  increase use of R hand/wrist   Short Term Goals      Time Frame for Short Term Goals 4-6 weeks   Short Term Goal 1 Pt will be Ind with HEP and report completing daily   STG 1 Current Status: --   STG Goal 1 Status: --   Short Term Goal 2 Pt will decrease QuickDASH score by 5 points for increased ability to complete ADLs/IADLs   STG 2 Current Status: --   STG Goal 2 Status: --   Short Term Goal 3 Pt will increase R finger ROM in order to be able to complete light fist and test  strength   STG 3 Current Status: --   STG Goal 3 Status: --   Short Term Goal 4 Pt will increase R wrist flexion by 15 degrees for increased ability to complete ADLs   STG 4 Current Status: --   STG Goal 4 Status: --   Short Term Goal 5 Pt will incease R wrist extension by 15 degrees for increased ability to complete work tasks   STG 5 Current Status: --   STG Goal 5 Status:        Patient Requires Follow-up:  [x]  Yes  []  No    Plan: [x]  Continue per plan of care []  Ruthy Cerda current plan (see comments)   []  Plan of care initiated []  Hold pending MD visit []  Discharge    Plan for Next Session:      Electronically signed by:  Kirsten Christianson OT,OTR/L

## 2023-02-01 ENCOUNTER — OFFICE VISIT (OUTPATIENT)
Dept: ORTHOPEDIC SURGERY | Age: 28
End: 2023-02-01

## 2023-02-01 VITALS — WEIGHT: 118 LBS | BODY MASS INDEX: 27.31 KG/M2 | HEIGHT: 55 IN

## 2023-02-01 DIAGNOSIS — S52.591A OTHER CLOSED FRACTURE OF DISTAL END OF RIGHT RADIUS, INITIAL ENCOUNTER: Primary | ICD-10-CM

## 2023-02-01 PROCEDURE — APPNB15 APP NON BILLABLE TIME 0-15 MINS: Performed by: NURSE PRACTITIONER

## 2023-02-01 PROCEDURE — 99024 POSTOP FOLLOW-UP VISIT: CPT | Performed by: NURSE PRACTITIONER

## 2023-02-01 NOTE — LETTER
[facility]  [place] [address] [address] [address]  Phone: [phone]  Fax: [phone]    NGHIA Bruno CNP        February 1, 2023     Patient: Tatyana Cesar   YOB: 1995   Date of Visit: 2/1/2023       To Whom It May Concern: It is my medical opinion that Tatyana Cesar may return to work on Monday 2/6/23 to light duty for 6 weeks. No lifting more than 10 pounds with the right hand during this time. If you have any questions or concerns, please don't hesitate to call.     Sincerely,        NGHIA Bruno CNP

## 2023-02-01 NOTE — PROGRESS NOTES
DIAGNOSIS:    1-Right distal radius 3-4 parts intra articular comminuted over 3weeks old fracture, status post ORIF. 2-Right distal ulna fracture    DATE OF SURGERY: 12/5/2022. HISTORY OF PRESENT ILLNESS:  Ms. Silviano Gonzales 32 y.o.  female right handed returns today  for 8 weeks postoperative visit. The patient denies any significant pain in the right wrist.  Rates pain a 2/10 VAS mild, aching, intermittent and are improving. Aggravating factors movement. Alleviating factors rest.  She is very stiff. No numbness or tingling sensation. No fever or Chills. She  is in a brace. She has been working with occupational therapy 3 times a week with very minimal improvement. Denies smoking. She will works requiring a lot of work with her hands and has been off work. PHYSICAL EXAMINATION:  The incision is completely healed . No signs of any erythema or drainage. She  has no pain with the active or passive range of motion of the right wrist, but decrease ROM. She is unable to make a full fist she can discontinue the. She  has intact sensation, distally, and she  is neurovascularly intact. IMAGING:  Three views right wrist taken today in the office showed anatomic alignment of right distal radius, plate and screws in good position, no loosening, there is a mild step-off. Distal ulna fracture. IMPRESSION:    1-8 weeks out from right distal radius ORIF and doing very well. 2-Right distal ulna fracture    PLAN:  I have told the patient to work on aggressive ROM, as well as strengthening exercises. A removable forearm brace. I discussed with the patient that I think that she would really benefit from a course of occupational therapy for further strengthening and stretching and she was instructed to continue aggressive OT. The patient will come back for a follow up in 6 weeks.   At that time, we will take 3 views of the right wrist.     As this patient has demonstrated risk factors for osteoporosis, such as age greater than [de-identified] years and evidence of a fracture, I have referred the patient back to the primary care physician for evaluation for osteoporosis, including consideration for DEXA scanning, if this is felt to be clinically indicated. The patient is advised to contact the primary care physician to follow-up for further evaluation.              Saúl Ramirez, APRN - CNP

## 2023-02-02 ENCOUNTER — TELEPHONE (OUTPATIENT)
Dept: ORTHOPEDIC SURGERY | Age: 28
End: 2023-02-02

## 2023-02-02 ENCOUNTER — HOSPITAL ENCOUNTER (OUTPATIENT)
Dept: OCCUPATIONAL THERAPY | Age: 28
Setting detail: THERAPIES SERIES
Discharge: HOME | End: 2023-02-02
Payer: COMMERCIAL

## 2023-02-02 PROCEDURE — 97018 PARAFFIN BATH THERAPY: CPT

## 2023-02-02 PROCEDURE — 97110 THERAPEUTIC EXERCISES: CPT

## 2023-02-02 NOTE — PROGRESS NOTES
Occupational Therapy      Occupational Therapy Daily Treatment Note    Date:  2023    Patient Name:  Barrett Hernandez     :  1995  MRN: [id number]    Restrictions/Precautions:    Medical/Treatment Diagnosis Information:  Diagnosis: S52.591A (ICD-10-CM) - Other closed fracture of distal end of right radius, initial encounter  Treatment Diagnosis: decreased ROM and strength of R hand and wrist  Insurance/Certification information:   [facility]  Physician Information:   Dejuan Kessler, APRN - CNP  Plan of care signed (Y/N):  Y  Visit# / total visits:     Pain level: 4/10 at rest and up to 5/10 with use     G-Code (if applicable):      Date / Visit # G-Code Applied:  /   QuickDASH Total Score: 28 (23 1509)       QuickDASH Disability/Symptom Score : 38.64 % (23 150)    Progress Note: []  Yes  [x]  No  Next due by: Visit #10      Subjective: Pt reports completing HEP ~3x a day. Uses heat and ice daily. Pt reports she was cleared to return to work on light duty per MD. 10# lifting restrictions. Objective Measures:  Dilia 23    Social History:   Social History  Lives With: Spouse (nephew)  Type of Home: House  Functional Status:  Functional Status  Prior level of function: Independent  Occupation: Full time employment  Type of Occupation: [employer]   Job Duties: Moderate lifting  ADL Assistance: Independent  Ambulation Assistance: Independent  Transfer Assistance: Independent  Active : Yes (not since injury)  Objective:     Activity Tolerance  Activity Tolerance: Patient tolerated evaluation without incident  Cognition  Overall Cognitive Status: WFL  LUE AROM (degrees)  LUE AROM : WFL  Left Hand AROM (degrees)  Left Hand AROM: WFL  RUE AROM (degrees)  R Forearm Pron 0-90: 10  R Forearm Supination  0-90: 10  R Wrist Flexion 0-80: 25  R Wrist Extension 0-70: 0  R Wrist Radial Deviation 0-20: 0  R Wrist Ulnar Deviation 0-45: 0  Right Hand AROM (degrees)  Right Hand General AROM: unable to complete full fist ~7 cm of 3rd digit to palm  LUE Strength  Gross LUE Strength: WFL  L Wrist Flex: 5/5  L Wrist Ext: 5/5  L Hand General: 5/5  RUE Strength  Gross RUE Strength: Exceptions to [facility]  R Wrist Flex: 2-/5  R Wrist Ext: 2-/5  R Hand General: 2-/5  Hand Dominance  Hand Dominance: Left  Left Hand Strength -  (lbs)  Handle Setting 2: NA  Right Hand Strength -  (lbs)  Handle Setting 2: NA                                Therapeutic Exercise:   Exercise/Equipment  Resistance/Repetitions   Other comments   AROM   X10 reps    Elbow:   Flex  Ext    Forearm:  Pronation/supination    Wrist:  Flex/ext  Ulnar/radial deviation  Circumduction    Hand:  Grasp/release   Increased AROM noted in fingers/wrist   Stretches   X10-15 reps; x30 second hold    Wrist:  Flex/ext  Ulnar/radial deviation    Hand/Finger:  Flex/ext:  Full palm  Individual finger:  MCP  PIP  DIP   Improved PROM    Ongoing cues for quality of stretches          Home Exercise Program:    Pt issued handout for AROM and stretches    Manual Treatments:      Modalities:  x10 minutes Paraffin to R wrist, hand, and fingers; fingers with passive stretch into fist during paraffin    Timed Code Treatment Minutes:  40 minutes     Total Treatment Minutes:  40 minutes (4676-4259)    Charges:  X1 Paraffin   X2 Ther Ex    Treatment/Activity Tolerance:     [x]  Patient tolerated treatment well []  Patient limited by fatigue    []  Patient limited by pain []  Patient limited by other medical complications   []  Other:     Assessment: Pt is a 32 y.o female who is s/p OPEN REDUCTION INTERNAL FIXATION RIGHT DISTAL RADIUS, 12/5/2022. Pt was placed in a [employer] [employer] W[employer] Long Forearm Brace. Pt now presents to OP OT with complaints of ongoing R wrist deficits. Pt demonstrates significant decreased ROM including R wrist and hand/fingers. Pt with minimal noted wrist or finger AROM.  Pt noted to have decreased R wrist strength and unable to test  strength due to inability to complete fist. Pt denies any sensation deficits. Pt reports 0/10 pain at rest and up to 6/10 pain with use of R hand. Pt reports not wearing brace \"much\" and is not present for evaluation. Despite ongoing deficits, pt reports completing ADLs Ind with increased difficulty. Pt continues to be significantly limited this date due to ongoing stiffness in R hand and fingers. Pt tolerates paraffin to R wrist and hand well to allow for increased pressure with stretching. Pt demonstrates ability to complete self PROM well with no cues. Pt tolerating increased pressure with PROM this date with both fingers and wrist. Pt demonstrates improved PROM and AROM of wrist and fingers although still significantly limited in both flexion and extension. Pt reports some increased soreness due to completing HEP regularly. Pt reports completing HEP ~3x a day but discussed completing 4-5x a day due to ongoing stiffness. Pt reports she was cleared by MD to return to work on lifting restrictions, despite ongoing limitation of AROM and strength. Pt reports using heat/ice as needed. Pt verbalized understanding. Continue with POC.      Prognosis: [x]  Good [x]  Fair  []  Poor    Goals:      Patient Goals       Patient goals  increase use of R hand/wrist   Short Term Goals      Time Frame for Short Term Goals 4-6 weeks   Short Term Goal 1 Pt will be Ind with HEP and report completing daily   STG 1 Current Status: --   STG Goal 1 Status: --   Short Term Goal 2 Pt will decrease QuickDASH score by 5 points for increased ability to complete ADLs/IADLs   STG 2 Current Status: --   STG Goal 2 Status: --   Short Term Goal 3 Pt will increase R finger ROM in order to be able to complete light fist and test  strength   STG 3 Current Status: --   STG Goal 3 Status: --   Short Term Goal 4 Pt will increase R wrist flexion by 15 degrees for increased ability to complete ADLs   STG 4 Current Status: --   STG Goal 4 Status: -- Short Term Goal 5 Pt will incease R wrist extension by 15 degrees for increased ability to complete work tasks   STG 5 Current Status: --   STG Goal 5 Status:        Patient Requires Follow-up:  [x]  Yes  []  No    Plan: [x]  Continue per plan of care []  Alter current plan (see comments)   []  Plan of care initiated []  Hold pending MD visit []  Discharge    Plan for Next Session:      Electronically signed by:  Maureen Hutchinson OT,OTR/L

## 2023-02-07 ENCOUNTER — HOSPITAL ENCOUNTER (OUTPATIENT)
Dept: OCCUPATIONAL THERAPY | Age: 28
Setting detail: THERAPIES SERIES
Discharge: HOME | End: 2023-02-07
Payer: COMMERCIAL

## 2023-02-07 PROCEDURE — 97110 THERAPEUTIC EXERCISES: CPT

## 2023-02-07 PROCEDURE — 97018 PARAFFIN BATH THERAPY: CPT

## 2023-02-07 NOTE — PROGRESS NOTES
Occupational Therapy      Occupational Therapy Daily Treatment Note    Date:  2023    Patient Name:  Kisha Aguilera     :  1995  MRN: [id number]    Restrictions/Precautions:    Medical/Treatment Diagnosis Information:  Diagnosis: S52.591A (ICD-10-CM) - Other closed fracture of distal end of right radius, initial encounter  Treatment Diagnosis: decreased ROM and strength of R hand and wrist  Insurance/Certification information:   [facility]  Physician Information:   NGHIA Hairston - CNP  Plan of care signed (Y/N):  Y  Visit# / total visits:     Pain level: 3/10 at rest and up to 5/10 with use     G-Code (if applicable):      Date / Visit # G-Code Applied:  /   QuickDASH Total Score: 21 (23)       QuickDASH Disability/Symptom Score : 22.73 % (23)    Progress Note: [x]  Yes  []  No  Next due by: Visit #10      Subjective: Pt reports completing HEP ~3x a day. Uses heat and ice daily. Pt reports she was cleared to return to work on light duty per MD. 10# lifting restrictions. Pt reports returning to work tonight. Objective Measures:  Emiliaal 23    Social History:   Social History  Lives With: Spouse (nephew)  Type of Home: House  Functional Status:  Functional Status  Prior level of function: Independent  Occupation: Full time employment  Type of Occupation: [place] [address]  Job Duties: Moderate lifting  ADL Assistance: Independent  Ambulation Assistance: Independent  Transfer Assistance: Independent  Active : Yes (not since injury)  Objective:     Activity Tolerance  Activity Tolerance: Patient tolerated evaluation without incident  Cognition  Overall Cognitive Status: WFL  LUE AROM (degrees)  LUE AROM : WFL  Left Hand AROM (degrees)  Left Hand AROM: WFL  RUE AROM (degrees)  R Forearm Pron 0-90: 10  R Forearm Supination  0-90: 10  R Wrist Flexion 0-80: 25  R Wrist Extension 0-70: 0  R Wrist Radial Deviation 0-20: 0  R Wrist Ulnar Deviation 0-45: 0  Right Hand AROM (degrees)  Right Hand General AROM: unable to complete full fist ~7 cm of 3rd digit to palm  LUE Strength  Gross LUE Strength: WFL  L Wrist Flex: 5/5  L Wrist Ext: 5/5  L Hand General: 5/5  RUE Strength  Gross RUE Strength: Exceptions to [facility]  R Wrist Flex: 2-/5  R Wrist Ext: 2-/5  R Hand General: 2-/5  Hand Dominance  Hand Dominance: Left  Left Hand Strength -  (lbs)  Handle Setting 2: NA  Right Hand Strength -  (lbs)  Handle Setting 2: NA   2/7/23    Social History:   Social History  Lives With: Spouse (nephew)  Type of Home: House  Functional Status:  Functional Status  Prior level of function: Independent  Occupation: Full time employment  Type of Occupation: [employer]  Job Duties: Moderate lifting  ADL Assistance: Independent  Ambulation Assistance: Independent  Transfer Assistance: Independent  Active : Yes (not since injury)  Objective:     Activity Tolerance  Activity Tolerance: Patient tolerated evaluation without incident  Cognition  Overall Cognitive Status: WFL  LUE AROM (degrees)  LUE AROM : WFL  Left Hand AROM (degrees)  Left Hand AROM: WFL  RUE AROM (degrees)  R Forearm Pron 0-90: 55  R Forearm Supination  0-90: 75  R Wrist Flexion 0-80: 60  R Wrist Extension 0-70: 45  R Wrist Radial Deviation 0-20: 10  R Wrist Ulnar Deviation 0-45: 35  Right Hand AROM (degrees)  Right Hand General AROM: unable to complete full fist ~4.5 cm of 3rd digit to palm  LUE Strength  Gross LUE Strength: WFL  L Wrist Flex: 5/5  L Wrist Ext: 5/5  L Hand General: 5/5  RUE Strength  Gross RUE Strength: Exceptions to [facility]  R Wrist Flex: 4-/5  R Wrist Ext: 4-/5  R Hand General: 2-/5  Hand Dominance  Hand Dominance: Left  Left Hand Strength -  (lbs)  Handle Setting 2: NA  Right Hand Strength -  (lbs)  Handle Setting 2: NA                             Therapeutic Exercise:   Exercise/Equipment  Resistance/Repetitions   Other comments   AROM   X10 reps    Elbow: Flex  Ext    Forearm:  Pronation/supination    Wrist:  Flex/ext  Ulnar/radial deviation  Circumduction    Hand:  Grasp/release   Increased AROM noted in fingers/wrist   Stretches   X10-15 reps; x30 second hold    Wrist:  Flex/ext  Ulnar/radial deviation    Hand/Finger:  Flex/ext:  Full palm  Individual finger:  MCP  PIP  DIP   Improved PROM    Ongoing cues for quality of stretches   Theraputty     X10 reps; pink theraputty    Grasp/release        Home Exercise Program:    Pt issued handout for AROM and stretches  Issued pink theraputty    Manual Treatments:      Modalities:  x10 minutes Paraffin to R wrist, hand, and fingers; fingers with passive stretch into fist during paraffin    Timed Code Treatment Minutes:  40 minutes     Total Treatment Minutes:  40 minutes (1144-5971)    Charges:  X1 Paraffin   X2 Ther Ex    Treatment/Activity Tolerance:     [x]  Patient tolerated treatment well []  Patient limited by fatigue    []  Patient limited by pain []  Patient limited by other medical complications   []  Other:     Assessment: Pt is a 32 y.o female who is s/p OPEN REDUCTION INTERNAL FIXATION RIGHT DISTAL RADIUS, 12/5/2022. Pt was placed in a [employer] Titan Wrist Long Forearm Brace. Pt now presents to OP OT with complaints of ongoing R wrist deficits. Pt demonstrates significant decreased ROM including R wrist and hand/fingers. Pt with minimal noted wrist or finger AROM. Pt noted to have decreased R wrist strength and unable to test  strength due to inability to complete fist. Pt denies any sensation deficits. Pt has made good progress towards goals meeting 4/5 goals at this time. Although pt has demonstrated some improved R wrist and finger AROM, pt continues to be significant limited. Pt continues to be unable to make full light fist. Pt reports continued pain primarily due to stretching and soreness. Pt demonstrates improved strength although difficulty functionally using due to AROM deficits.  Pt able to complete HEP Ind and reports completing multiple times daily. Pt reports she is returning to work this date. Pt will continue to benefit from skilled OT services at this time at a frequency of 1-2x a week for 4 weeks.      Prognosis: [x]  Good [x]  Fair  []  Poor    Goals:      Patient Goals       Patient goals  increase use of R hand/wrist   Short Term Goals      Time Frame for Short Term Goals 4-6 weeks   Short Term Goal 1 Pt will be Ind with HEP and report completing daily- goal met 2/7   STG 1 Current Status: --   STG Goal 1 Status: --   Short Term Goal 2 Pt will decrease QuickDASH score by 5 points for increased ability to complete ADLs/IADLs- goal met 2/7- update additional 3 points   STG 2 Current Status: --   STG Goal 2 Status: --   Short Term Goal 3 Pt will increase R finger ROM in order to be able to complete light fist and test  strength- ongoing 2/7   STG 3 Current Status: --   STG Goal 3 Status: --   Short Term Goal 4 Pt will increase R wrist flexion by 15 degrees for increased ability to complete ADLs- goal met 2/7- update additional 10 degrees   STG 4 Current Status: --   STG Goal 4 Status: --   Short Term Goal 5 Pt will incease R wrist extension by 15 degrees for increased ability to complete work tasks- goal met 2/7- update additional 10 degrees   STG 5 Current Status: --   STG Goal 5 Status:        Patient Requires Follow-up:  [x]  Yes  []  No    Plan: []  Continue per plan of care [x]  Alter current plan (1-2x a week for 4 weeks)   []  Plan of care initiated []  Hold pending MD visit []  Discharge    Plan for Next Session:      Electronically signed by:  Nehemiah Kenny, OT,OTR/L

## 2023-02-07 NOTE — PROGRESS NOTES
Occupational Therapy      Outpatient Occupational Therapy  [] [facility] [address] MATEUS VALLE   Phone: [phone]   Fax: [phone]   [x] [place] [facility]           Phone: [phone]   Fax: [phone]  [] Santi Bosch   Phone: [phone]   Fax: [phone]     Occupational Therapy Progress Note  Date: 2023        Patient Name:  Preethi Vasquez    :  1995  MRN: [id number]    Restrictions/Precautions:    Medical/Treatment Diagnosis Information:  Diagnosis: C13.086N (ICD-10-CM) - Other closed fracture of distal end of right radius, initial encounter  Treatment Diagnosis: decreased ROM and strength of R hand and wrist  Insurance/Certification information:   [facility]  Physician Information:   NGHIA Castorena CNP  Plan of care signed (Y/N):  Y  Visit# / total visits:     Pain level:      3/10 at rest and up to 5/10 with use        G-Code (if applicable):                                             Date / Visit # G-Code Applied:  /   QuickDASH Total Score: 21 (23)       QuickDASH Disability/Symptom Score : 22.73 % (23)    Time Period for Report:  23-23  Cancels/No-shows to date:  1 cancel    Plan of Care/Treatment to date:  [x] Therapeutic Exercise    [x] Modalities:  [x] Therapeutic Activity     [] Ultrasound  [] Electrical Stimulation  [] Total Motion Release     [] Fluidotherapy [] Patricia Push  [] Neuromuscular Re-education  [x] Cold/hotpack [] Iontophoresis  [x] Instruction in HEP     Other: Paraffin  [x] Manual Therapy      [x]            [] Aquatic Therapy      []                         Significant Findings At Last Visit/Comments:    Subjective:    Pt reports completing HEP ~3x a day. Uses heat and ice daily. Pt reports she was cleared to return to work on light duty per MD. 10# lifting restrictions. Pt reports returning to work tonight.       Objective:  Objective Measures:  Eval 23     Social History:   Social History  Lives With: Spouse (nephew)  Type of Home: House  Functional Status:  Functional Status  Prior level of function: Independent  Occupation: Full time employment  Type of Occupation: [employer]  Job Duties: Moderate lifting  ADL Assistance: Independent  Ambulation Assistance: Independent  Transfer Assistance: Independent  Active : Yes (not since injury)  Objective: Activity Tolerance  Activity Tolerance: Patient tolerated evaluation without incident  Cognition  Overall Cognitive Status: WFL  LUE AROM (degrees)  LUE AROM : WFL  Left Hand AROM (degrees)  Left Hand AROM: WFL  RUE AROM (degrees)  R Forearm Pron 0-90: 10  R Forearm Supination  0-90: 10  R Wrist Flexion 0-80: 25  R Wrist Extension 0-70: 0  R Wrist Radial Deviation 0-20: 0  R Wrist Ulnar Deviation 0-45: 0  Right Hand AROM (degrees)  Right Hand General AROM: unable to complete full fist ~7 cm of 3rd digit to palm  LUE Strength  Gross LUE Strength: WFL  L Wrist Flex: 5/5  L Wrist Ext: 5/5  L Hand General: 5/5  RUE Strength  Gross RUE Strength: Exceptions to [facility]  R Wrist Flex: 2-/5  R Wrist Ext: 2-/5  R Hand General: 2-/5  Hand Dominance  Hand Dominance: Left  Left Hand Strength -  (lbs)  Handle Setting 2: NA  Right Hand Strength -  (lbs)  Handle Setting 2: NA    2/7/23     Social History:   Social History  Lives With: Spouse (nephew)  Type of Home: House  Functional Status:  Functional Status  Prior level of function: Independent  Occupation: Full time employment  Type of Occupation: [place] [address]  Job Duties: Moderate lifting  ADL Assistance: Independent  Ambulation Assistance: Independent  Transfer Assistance: Independent  Active : Yes (not since injury)  Objective:     Activity Tolerance  Activity Tolerance: Patient tolerated evaluation without incident  Cognition  Overall Cognitive Status: WFL  LUE AROM (degrees)  LUE AROM : WFL  Left Hand AROM (degrees)  Left Hand AROM: WFL  RUE AROM (degrees)  R Forearm Pron 0-90: 55  R Forearm Supination  0-90: 75  R Wrist Flexion 0-80: 60  R Wrist Extension 0-70: 45  R Wrist Radial Deviation 0-20: 10  R Wrist Ulnar Deviation 0-45: 35  Right Hand AROM (degrees)  Right Hand General AROM: unable to complete full fist ~4.5 cm of 3rd digit to palm  LUE Strength  Gross LUE Strength: WFL  L Wrist Flex: 5/5  L Wrist Ext: 5/5  L Hand General: 5/5  RUE Strength  Gross RUE Strength: [facility]  R Wrist Flex: 4-/5  R Wrist Ext: 4-/5  R Hand General: 2-/5  Hand Dominance  Hand Dominance: Left  Left Hand Strength -  (lbs)  Handle Setting 2: NA  Right Hand Strength -  (lbs)  Handle Setting 2: NA        Assessment:  Pt is a 32 y.o female who is s/p OPEN REDUCTION INTERNAL FIXATION RIGHT DISTAL RADIUS, 12/5/2022. Pt was placed in a [employer] Titan Wrist Long Forearm Brace. Pt now presents to OP OT with complaints of ongoing R wrist deficits. Pt demonstrates significant decreased ROM including R wrist and hand/fingers. Pt with minimal noted wrist or finger AROM. Pt noted to have decreased R wrist strength and unable to test  strength due to inability to complete fist. Pt denies any sensation deficits. Pt has made good progress towards goals meeting 4/5 goals at this time. Although pt has demonstrated some improved R wrist and finger AROM, pt continues to be significant limited. Pt continues to be unable to make full light fist. Pt reports continued pain primarily due to stretching and soreness. Pt demonstrates improved strength although difficulty functionally using due to AROM deficits. Pt able to complete HEP Ind and reports completing multiple times daily. Pt reports she is returning to work this date. Pt will continue to benefit from skilled OT services at this time at a frequency of 1-2x a week for 4 weeks.      Progress towards goals:    Goals:        Patient Goals       Patient goals  increase use of R hand/wrist   Short Term Goals      Time Frame for Short Term Goals 4-6 weeks   Short Term Goal 1 Pt will be Ind with HEP and report completing daily- goal met 2/7   STG 1 Current Status: --   STG Goal 1 Status: --   Short Term Goal 2 Pt will decrease QuickDASH score by 5 points for increased ability to complete ADLs/IADLs- goal met 2/7- update additional 3 points   STG 2 Current Status: --   STG Goal 2 Status: --   Short Term Goal 3 Pt will increase R finger ROM in order to be able to complete light fist and test  strength- ongoing 2/7   STG 3 Current Status: --   STG Goal 3 Status: --   Short Term Goal 4 Pt will increase R wrist flexion by 15 degrees for increased ability to complete ADLs- goal met 2/7- update additional 10 degrees   STG 4 Current Status: --   STG Goal 4 Status: --   Short Term Goal 5 Pt will incease R wrist extension by 15 degrees for increased ability to complete work tasks- goal met 2/7- update additional 10 degrees   STG 5 Current Status: --   STG Goal 5 Status:          Current Frequency/Duration:  # Days per week: [x] 1 day # Weeks: [] 1 week [x] 4 weeks      [x] 2 days   [] 2 weeks [] 5 weeks      [] 3 days   [] 3 weeks [] 6 weeks     Rehab Potential: [] Excellent [x] Good [] Fair  [] Poor     Goal Status:  [] Achieved [x] Partially Achieved  [] Not Achieved     Patient Status: [] Continue per initial plan of Care     [] Patient now discharged     [x] Additional visits requested, Please re-certify for additional visits:      Requested frequency/duration:  1-2X/week for 4 weeks    Electronically signed by:  Erin Lin OT, OTR/L    If you have any questions or concerns, please don't hesitate to call.   Thank you for your referral.    Physician Signature:________________________________Date:__________________  By signing above, therapists plan is approved by physician

## 2023-02-09 ENCOUNTER — HOSPITAL ENCOUNTER (OUTPATIENT)
Dept: OCCUPATIONAL THERAPY | Age: 28
Setting detail: THERAPIES SERIES
Discharge: HOME | End: 2023-02-09
Payer: COMMERCIAL

## 2023-02-09 NOTE — PROGRESS NOTES
Occupational Therapy      Occupational Therapy  Cancellation/No-show Note  Patient Name:  Coco Márquez   :  1995   Date:  2023  Cancelled visits to date: 2  No-shows to date: 0    For today's appointment patient:  [x]    Cancelled  []    Rescheduled appointment  []    No-show     Reason given by patient:  []    Patient ill  []    Conflicting appointment  []    No transportation    []    Conflict with work  [x]    No reason given  []    Other:     Comments:       Electronically signed by:  Lucia Ortiz OT, OTR/L

## 2023-02-14 ENCOUNTER — HOSPITAL ENCOUNTER (OUTPATIENT)
Dept: OCCUPATIONAL THERAPY | Age: 28
Setting detail: THERAPIES SERIES
Discharge: HOME | End: 2023-02-14
Payer: COMMERCIAL

## 2023-02-14 PROCEDURE — 97110 THERAPEUTIC EXERCISES: CPT

## 2023-02-14 PROCEDURE — 97018 PARAFFIN BATH THERAPY: CPT

## 2023-02-14 NOTE — PROGRESS NOTES
Occupational Therapy      Occupational Therapy Daily Treatment Note    Date:  2023    Patient Name:  Sherwin Blair     :  1995  MRN: [id number]    Restrictions/Precautions:    Medical/Treatment Diagnosis Information:  Diagnosis: S52.591A (ICD-10-CM) - Other closed fracture of distal end of right radius, initial encounter  Treatment Diagnosis: decreased ROM and strength of R hand and wrist  Insurance/Certification information:   [facility]  Physician Information:   NGHIA Ruiz - CNP  Plan of care signed (Y/N):  Y  Visit# / total visits:  ,    Pain level: 0/10 at rest and up to 3/10 with use     G-Code (if applicable):      Date / Visit # G-Code Applied:  /   QuickDASH Total Score: 21 (23)       QuickDASH Disability/Symptom Score : 22.73 % (23)    Progress Note: []  Yes  [x]  No  Next due by: Visit #10      Subjective: Pt reports completing HEP ~2x a day. Uses heat and ice as needed. Pt reports work is going well. Minimal pain noted. Pt 30 min late for appointment although reports having no phone so unable to call. Objective Measures:  Eval 23    Social History:   Social History  Lives With: Spouse (nephew)  Type of Home: House  Functional Status:  Functional Status  Prior level of function: Independent  Occupation: Full time employment  Type of Occupation: [employer]  Job Duties: Moderate lifting  ADL Assistance: Independent  Ambulation Assistance: Independent  Transfer Assistance: Independent  Active : Yes (not since injury)  Objective:     Activity Tolerance  Activity Tolerance: Patient tolerated evaluation without incident  Cognition  Overall Cognitive Status: WFL  LUE AROM (degrees)  LUE AROM : WFL  Left Hand AROM (degrees)  Left Hand AROM: WFL  RUE AROM (degrees)  R Forearm Pron 0-90: 10  R Forearm Supination  0-90: 10  R Wrist Flexion 0-80: 25  R Wrist Extension 0-70: 0  R Wrist Radial Deviation 0-20: 0  R Wrist Ulnar Deviation 0-45: 0  Right Hand AROM (degrees)  Right Hand General AROM: unable to complete full fist ~7 cm of 3rd digit to palm  LUE Strength  Gross LUE Strength: WFL  L Wrist Flex: 5/5  L Wrist Ext: 5/5  L Hand General: 5/5  RUE Strength  Gross RUE Strength: Exceptions to [facility]  R Wrist Flex: 2-/5  R Wrist Ext: 2-/5  R Hand General: 2-/5  Hand Dominance  Hand Dominance: Left  Left Hand Strength -  (lbs)  Handle Setting 2: NA  Right Hand Strength -  (lbs)  Handle Setting 2: NA   2/7/23    Social History:   Social History  Lives With: Spouse (nephew)  Type of Home: House  Functional Status:  Functional Status  Prior level of function: Independent  Occupation: Full time employment  Type of Occupation: [employer]  Job Duties: Moderate lifting  ADL Assistance: Independent  Ambulation Assistance: Independent  Transfer Assistance: Independent  Active : Yes (not since injury)  Objective:     Activity Tolerance  Activity Tolerance: Patient tolerated evaluation without incident  Cognition  Overall Cognitive Status: WFL  LUE AROM (degrees)  LUE AROM : WFL  Left Hand AROM (degrees)  Left Hand AROM: WFL  RUE AROM (degrees)  R Forearm Pron 0-90: 55  R Forearm Supination  0-90: 75  R Wrist Flexion 0-80: 60  R Wrist Extension 0-70: 45  R Wrist Radial Deviation 0-20: 10  R Wrist Ulnar Deviation 0-45: 35  Right Hand AROM (degrees)  Right Hand General AROM: unable to complete full fist ~4.5 cm of 3rd digit to palm  LUE Strength  Gross LUE Strength: WFL  L Wrist Flex: 5/5  L Wrist Ext: 5/5  L Hand General: 5/5  RUE Strength  Gross RUE Strength: Exceptions to [facility]  R Wrist Flex: 4-/5  R Wrist Ext: 4-/5  R Hand General: 2-/5  Hand Dominance  Hand Dominance: Left  Left Hand Strength -  (lbs)  Handle Setting 2: NA  Right Hand Strength -  (lbs)  Handle Setting 2: NA                             Therapeutic Exercise:   Exercise/Equipment  Resistance/Repetitions   Other comments   AROM   X10 reps    Elbow: Flex  Ext    Forearm:  Pronation/supination    Wrist:  Flex/ext  Ulnar/radial deviation  Circumduction    Hand:  Grasp/release (in wrist flexion, neurtral, and extension)  AB/AD  opposition   IStretches   X10-15 reps; x30 second hold    Wrist:  Flex/ext  Ulnar/radial deviation    Hand/Finger:  Flex/ext:  Full palm  Individual finger:  MCP  PIP  DIP   Improved PROM    Theraputty     X10 reps; pink theraputty    Grasp/release        Home Exercise Program:    Pt issued handout for AROM and stretches  Issued pink theraputty    Manual Treatments:      Modalities:  x10 minutes Paraffin to R wrist, hand, and fingers; fingers with passive stretch into fist during paraffin    Timed Code Treatment Minutes:  45 minutes     Total Treatment Minutes:  40 minutes ([id number])    Charges:  X1 Paraffin   X2 Ther Ex    Treatment/Activity Tolerance:     [x]  Patient tolerated treatment well []  Patient limited by fatigue    []  Patient limited by pain []  Patient limited by other medical complications   []  Other:     Assessment: Pt is a 32 y.o female who is s/p OPEN REDUCTION INTERNAL FIXATION RIGHT DISTAL RADIUS, 12/5/2022. Pt was placed in a [employer] Titan Wrist Long Forearm Brace. Pt now presents to OP OT with complaints of ongoing R wrist deficits. Pt demonstrates significant decreased ROM including R wrist and hand/fingers. Pt with minimal noted wrist or finger AROM. Pt noted to have decreased R wrist strength and unable to test  strength due to inability to complete fist. Pt denies any sensation deficits. Pt continues to be significantly limited. Pt continues to be unable to make full light fist. Pt reports improved pain including 0/10 at rest. Pt tolerates stretching well with minimal reports of increased pain. Pt continues to be significantly limited with finger flexion with pt unable to complete full fist. Pt tolerates light  strengthening although limited by AROM.  Pt able to complete HEP Ind and reports completing multiple times daily. Continue with POC.      Prognosis: [x]  Good [x]  Fair  []  Poor    Goals:      Patient Goals       Patient goals  increase use of R hand/wrist   Short Term Goals      Time Frame for Short Term Goals 4-6 weeks   Short Term Goal 1 Pt will be Ind with HEP and report completing daily- goal met 2/7   STG 1 Current Status: --   STG Goal 1 Status: --   Short Term Goal 2 Pt will decrease QuickDASH score by 5 points for increased ability to complete ADLs/IADLs- goal met 2/7- update additional 3 points   STG 2 Current Status: --   STG Goal 2 Status: --   Short Term Goal 3 Pt will increase R finger ROM in order to be able to complete light fist and test  strength- ongoing 2/7   STG 3 Current Status: --   STG Goal 3 Status: --   Short Term Goal 4 Pt will increase R wrist flexion by 15 degrees for increased ability to complete ADLs- goal met 2/7- update additional 10 degrees   STG 4 Current Status: --   STG Goal 4 Status: --   Short Term Goal 5 Pt will incease R wrist extension by 15 degrees for increased ability to complete work tasks- goal met 2/7- update additional 10 degrees   STG 5 Current Status: --   STG Goal 5 Status:        Patient Requires Follow-up:  [x]  Yes  []  No    Plan: [x]  Continue per plan of care []  Alter current plan (1-2x a week for 4 weeks)   []  Plan of care initiated []  Hold pending MD visit []  Discharge    Plan for Next Session:      Electronically signed by:  Renée Parekh OT,OTR/L

## 2023-02-16 ENCOUNTER — HOSPITAL ENCOUNTER (OUTPATIENT)
Dept: OCCUPATIONAL THERAPY | Age: 28
Setting detail: THERAPIES SERIES
Discharge: HOME | End: 2023-02-16
Payer: COMMERCIAL

## 2023-02-16 NOTE — PROGRESS NOTES
Occupational Therapy      Occupational Therapy  Cancellation/No-show Note  Patient Name:  Yumiko Grimes   :  1995   Date:  2023  Cancelled visits to date: 3  No-shows to date: 0    For today's appointment patient:  [x]    Cancelled  []    Rescheduled appointment  []    No-show     Reason given by patient:  []    Patient ill  []    Conflicting appointment  []    No transportation    []    Conflict with work  []    No reason given  [x]    Other:     Comments:   Pt reports unable to attend due to a family emergency.      Electronically signed by:  Anup Ponce OT, OTR/L

## 2023-02-21 ENCOUNTER — HOSPITAL ENCOUNTER (OUTPATIENT)
Dept: OCCUPATIONAL THERAPY | Age: 28
Setting detail: THERAPIES SERIES
Discharge: HOME | End: 2023-02-21
Payer: COMMERCIAL

## 2023-02-21 PROCEDURE — 97018 PARAFFIN BATH THERAPY: CPT

## 2023-02-21 PROCEDURE — 97110 THERAPEUTIC EXERCISES: CPT

## 2023-02-21 NOTE — PROGRESS NOTES
Occupational Therapy      Occupational Therapy Daily Treatment Note    Date:  2023    Patient Name:  Cuco Bañuelos     :  1995  MRN: [id number]    Restrictions/Precautions:    Medical/Treatment Diagnosis Information:  Diagnosis: S52.591A (ICD-10-CM) - Other closed fracture of distal end of right radius, initial encounter  Treatment Diagnosis: decreased ROM and strength of R hand and wrist  Insurance/Certification information:   [facility]  Physician Information:   NGHIA Coronel - CNP  Plan of care signed (Y/N):  Y  Visit# / total visits:  ,    Pain level: 0/10 at rest and up to 5/10 with use at work at times     G-Code (if applicable):      Date / Visit # G-Code Applied:  /   QuickDASH Total Score: 21 (23)       QuickDASH Disability/Symptom Score : 22.73 % (23)    Progress Note: []  Yes  [x]  No  Next due by: Visit #10      Subjective: Pt reports completing HEP ~2x a day. Uses heat and ice as needed. Pt reports some ongoing soreness and pain with use at work although work has been able to provide increased assist.     Objective Measures:  Eval 23    Social History:   Social History  Lives With: Spouse (nephew)  Type of Home: House  Functional Status:  Functional Status  Prior level of function: Independent  Occupation: Full time employment  Type of Occupation: [place] [address]  Job Duties: Moderate lifting  ADL Assistance: Independent  Ambulation Assistance: Independent  Transfer Assistance: Independent  Active : Yes (not since injury)  Objective:     Activity Tolerance  Activity Tolerance: Patient tolerated evaluation without incident  Cognition  Overall Cognitive Status: WFL  LUE AROM (degrees)  LUE AROM : WFL  Left Hand AROM (degrees)  Left Hand AROM: WFL  RUE AROM (degrees)  R Forearm Pron 0-90: 10  R Forearm Supination  0-90: 10  R Wrist Flexion 0-80: 25  R Wrist Extension 0-70: 0  R Wrist Radial Deviation 0-20: 0  R Wrist Ulnar Deviation 0-45: 0  Right Hand AROM (degrees)  Right Hand General AROM: unable to complete full fist ~7 cm of 3rd digit to palm  LUE Strength  Gross LUE Strength: WFL  L Wrist Flex: 5/5  L Wrist Ext: 5/5  L Hand General: 5/5  RUE Strength  Gross RUE Strength: Exceptions to [facility]  R Wrist Flex: 2-/5  R Wrist Ext: 2-/5  R Hand General: 2-/5  Hand Dominance  Hand Dominance: Left  Left Hand Strength -  (lbs)  Handle Setting 2: NA  Right Hand Strength -  (lbs)  Handle Setting 2: NA   2/7/23    Social History:   Social History  Lives With: Spouse (nephew)  Type of Home: House  Functional Status:  Functional Status  Prior level of function: Independent  Occupation: Full time employment  Type of Occupation: [employer]  Job Duties: Moderate lifting  ADL Assistance: Independent  Ambulation Assistance: Independent  Transfer Assistance: Independent  Active : Yes (not since injury)  Objective:     Activity Tolerance  Activity Tolerance: Patient tolerated evaluation without incident  Cognition  Overall Cognitive Status: WFL  LUE AROM (degrees)  LUE AROM : WFL  Left Hand AROM (degrees)  Left Hand AROM: WFL  RUE AROM (degrees)  R Forearm Pron 0-90: 55  R Forearm Supination  0-90: 75  R Wrist Flexion 0-80: 60  R Wrist Extension 0-70: 45  R Wrist Radial Deviation 0-20: 10  R Wrist Ulnar Deviation 0-45: 35  Right Hand AROM (degrees)  Right Hand General AROM: unable to complete full fist ~4.5 cm of 3rd digit to palm  LUE Strength  Gross LUE Strength: WFL  L Wrist Flex: 5/5  L Wrist Ext: 5/5  L Hand General: 5/5  RUE Strength  Gross RUE Strength: Exceptions to [facility]  R Wrist Flex: 4-/5  R Wrist Ext: 4-/5  R Hand General: 2-/5  Hand Dominance  Hand Dominance: Left  Left Hand Strength -  (lbs)  Handle Setting 2: NA  Right Hand Strength -  (lbs)  Handle Setting 2: NA                             Therapeutic Exercise:   Exercise/Equipment  Resistance/Repetitions   Other comments   AROM   X10 reps    Elbow: Flex  Ext    Forearm:  Pronation/supination    Wrist:  Flex/ext  Ulnar/radial deviation  Circumduction    Hand:  Grasp/release (in wrist flexion, neurtral, and extension)  AB/AD  opposition   IStretches   X10-15 reps; x30 second hold    Wrist:  Flex/ext  Ulnar/radial deviation    Hand/Finger:  Flex/ext:  Full palm  Individual finger:  MCP  PIP  DIP   Improved PROM         Home Exercise Program:    Pt issued handout for AROM and stretches  Issued pink theraputty    Manual Treatments:      Modalities:  x10 minutes Paraffin to R wrist, hand, and fingers; fingers with passive stretch into fist during paraffin    Timed Code Treatment Minutes:  45 minutes     Total Treatment Minutes:  40 minutes ([id number])    Charges:  X1 Paraffin   X2 Ther Ex    Treatment/Activity Tolerance:     [x]  Patient tolerated treatment well []  Patient limited by fatigue    []  Patient limited by pain []  Patient limited by other medical complications   []  Other:     Assessment: Pt is a 32 y.o female who is s/p OPEN REDUCTION INTERNAL FIXATION RIGHT DISTAL RADIUS, 12/5/2022. Pt was placed in a [employer] Titan Wrist Long Forearm Brace. Pt now presents to OP OT with complaints of ongoing R wrist deficits. Pt demonstrates significant decreased ROM including R wrist and hand/fingers. Pt with minimal noted wrist or finger AROM. Pt noted to have decreased R wrist strength and unable to test  strength due to inability to complete fist. Pt denies any sensation deficits. Pt continues to be significantly limited. Pt continues to be unable to make full light fist. Pt reports improved pain including 0/10 at rest and up to 5/10 with use at work. Pt tolerates stretching well with minimal reports of increased pain. Pt continues to be significantly limited with finger flexion with pt unable to complete full fist. Pt able to complete HEP Ind and reports completing ~2x a day.  Discussed with pt ongoing importance of completing HEP as much as possible due to ongoing stiffness. Pt verbalized understanding. Continue with POC.      Prognosis: [x]  Good [x]  Fair  []  Poor    Goals:      Patient Goals       Patient goals  increase use of R hand/wrist   Short Term Goals      Time Frame for Short Term Goals 4-6 weeks   Short Term Goal 1 Pt will be Ind with HEP and report completing daily- goal met 2/7   STG 1 Current Status: --   STG Goal 1 Status: --   Short Term Goal 2 Pt will decrease QuickDASH score by 5 points for increased ability to complete ADLs/IADLs- goal met 2/7- update additional 3 points   STG 2 Current Status: --   STG Goal 2 Status: --   Short Term Goal 3 Pt will increase R finger ROM in order to be able to complete light fist and test  strength- ongoing 2/7   STG 3 Current Status: --   STG Goal 3 Status: --   Short Term Goal 4 Pt will increase R wrist flexion by 15 degrees for increased ability to complete ADLs- goal met 2/7- update additional 10 degrees   STG 4 Current Status: --   STG Goal 4 Status: --   Short Term Goal 5 Pt will incease R wrist extension by 15 degrees for increased ability to complete work tasks- goal met 2/7- update additional 10 degrees   STG 5 Current Status: --   STG Goal 5 Status:        Patient Requires Follow-up:  [x]  Yes  []  No    Plan: [x]  Continue per plan of care []  Alter current plan (1-2x a week for 4 weeks)   []  Plan of care initiated []  Hold pending MD visit []  Discharge    Plan for Next Session:      Electronically signed by:  Ana Maria Martin OT,OTR/L

## 2023-02-23 ENCOUNTER — HOSPITAL ENCOUNTER (OUTPATIENT)
Dept: OCCUPATIONAL THERAPY | Age: 28
Setting detail: THERAPIES SERIES
End: 2023-02-23
Payer: COMMERCIAL

## 2023-02-23 NOTE — PROGRESS NOTES
Occupational Therapy      Occupational Therapy  Cancellation/No-show Note  Patient Name:  Christy Donnelly   :  1995   Date:  2023  Cancelled visits to date: 4  No-shows to date: 0    For today's appointment patient:  [x]    Cancelled  []    Rescheduled appointment  []    No-show     Reason given by patient:  []    Patient ill  []    Conflicting appointment  []    No transportation    []    Conflict with work  []    No reason given  [x]    Other:     Comments:   Pt reports it is too difficult to complete 2x a week at this time due to returning to work. Pt requesting to complete Tuesday's only moving forward.      Electronically signed by:  Santiago Reza OT, OTR/L

## 2023-02-28 ENCOUNTER — HOSPITAL ENCOUNTER (OUTPATIENT)
Dept: OCCUPATIONAL THERAPY | Age: 28
Setting detail: THERAPIES SERIES
Discharge: HOME | End: 2023-02-28
Payer: COMMERCIAL

## 2023-02-28 PROCEDURE — 97018 PARAFFIN BATH THERAPY: CPT

## 2023-02-28 PROCEDURE — 97110 THERAPEUTIC EXERCISES: CPT

## 2023-02-28 NOTE — PROGRESS NOTES
Occupational Therapy      Occupational Therapy Daily Treatment Note    Date:  2023    Patient Name:  Mary Jara     :  1995  MRN: [id number]    Restrictions/Precautions:    Medical/Treatment Diagnosis Information:  Diagnosis: S52.591A (ICD-10-CM) - Other closed fracture of distal end of right radius, initial encounter  Treatment Diagnosis: decreased ROM and strength of R hand and wrist  Insurance/Certification information:   [facility]  Physician Information:   NGHIA Stover - CNP  Plan of care signed (Y/N):  Y  Visit# / total visits:  , 3/8   Pain level: 4/10 at rest and up to 6/10 with use at work at times     G-Code (if applicable):      Date / Visit # G-Code Applied:  /   QuickDASH Total Score: 21 (23)       QuickDASH Disability/Symptom Score : 22.73 % (23)    Progress Note: []  Yes  [x]  No  Next due by: Visit #10      Subjective: Pt reports completing HEP as able throughout day although unsure of how regularly HEP is being completed with return to work. Objective Measures:  Eval 23    Social History:   Social History  Lives With: Spouse (nephew)  Type of Home: House  Functional Status:  Functional Status  Prior level of function: Independent  Occupation: Full time employment  Type of Occupation: [employer]  Job Duties: Moderate lifting  ADL Assistance: Independent  Ambulation Assistance: Independent  Transfer Assistance: Independent  Active : Yes (not since injury)  Objective:     Activity Tolerance  Activity Tolerance: Patient tolerated evaluation without incident  Cognition  Overall Cognitive Status: WFL  LUE AROM (degrees)  LUE AROM : WFL  Left Hand AROM (degrees)  Left Hand AROM: WFL  RUE AROM (degrees)  R Forearm Pron 0-90: 10  R Forearm Supination  0-90: 10  R Wrist Flexion 0-80: 25  R Wrist Extension 0-70: 0  R Wrist Radial Deviation 0-20: 0  R Wrist Ulnar Deviation 0-45: 0  Right Hand AROM (degrees)  Right Hand General AROM: unable to complete full fist ~7 cm of 3rd digit to palm  LUE Strength  Gross LUE Strength: WFL  L Wrist Flex: 5/5  L Wrist Ext: 5/5  L Hand General: 5/5  RUE Strength  Gross RUE Strength: Exceptions to [facility]  R Wrist Flex: 2-/5  R Wrist Ext: 2-/5  R Hand General: 2-/5  Hand Dominance  Hand Dominance: Left  Left Hand Strength -  (lbs)  Handle Setting 2: NA  Right Hand Strength -  (lbs)  Handle Setting 2: NA   2/7/23    Social History:   Social History  Lives With: Spouse (nephew)  Type of Home: House  Functional Status:  Functional Status  Prior level of function: Independent  Occupation: Full time employment  [place]ation: [place] [address]  Job Duties: Moderate lifting  ADL Assistance: Independent  Ambulation Assistance: Independent  Transfer Assistance: Independent  Active : Yes (not since injury)  Objective:     Activity Tolerance  Activity Tolerance: Patient tolerated evaluation without incident  Cognition  Overall Cognitive Status: WFL  LUE AROM (degrees)  LUE AROM : WFL  Left Hand AROM (degrees)  Left Hand AROM: WFL  RUE AROM (degrees)  R Forearm Pron 0-90: 55  R Forearm Supination  0-90: 75  R Wrist Flexion 0-80: 60  R Wrist Extension 0-70: 45  R Wrist Radial Deviation 0-20: 10  R Wrist Ulnar Deviation 0-45: 35  Right Hand AROM (degrees)  Right Hand General AROM: unable to complete full fist ~4.5 cm of 3rd digit to palm  LUE Strength  Gross LUE Strength: WFL  L Wrist Flex: 5/5  L Wrist Ext: 5/5  L Hand General: 5/5  RUE Strength  Gross RUE Strength: Exceptions to [facility]  R Wrist Flex: 4-/5  R Wrist Ext: 4-/5  R Hand General: 2-/5  Hand Dominance  Hand Dominance: Left  Left Hand Strength -  (lbs)  Handle Setting 2: NA  Right Hand Strength -  (lbs)  Handle Setting 2: NA                             Therapeutic Exercise:   Exercise/Equipment  Resistance/Repetitions   Other comments   AROM   X10 reps    Elbow:   Flex  Ext    Forearm:  Pronation/supination    Wrist:  Flex/ext  Ulnar/radial deviation  Circumduction    Hand:  Grasp/release (in wrist flexion, neurtral, and extension)  AB/AD  opposition   IStretches   X10-15 reps; x30 second hold    Wrist:  Flex/ext  Ulnar/radial deviation    Hand/Finger:  Flex/ext:  Full palm  Individual finger:  MCP  PIP  DIP   Improved PROM         Home Exercise Program:    Pt issued handout for AROM and stretches  Issued pink theraputty    Manual Treatments:      Modalities:  x10 minutes Paraffin to R wrist, hand, and fingers; fingers with passive stretch into fist during paraffin    Timed Code Treatment Minutes:  45 minutes     Total Treatment Minutes:  40 minutes ([id number])    Charges:  X1 Paraffin   X2 Ther Ex    Treatment/Activity Tolerance:     [x]  Patient tolerated treatment well []  Patient limited by fatigue    []  Patient limited by pain []  Patient limited by other medical complications   []  Other:     Assessment: Pt is a 32 y.o female who is s/p OPEN REDUCTION INTERNAL FIXATION RIGHT DISTAL RADIUS, 12/5/2022. Pt was placed in a [employer] Wrist Long Forearm Brace. Pt now presents to OP OT with complaints of ongoing R wrist deficits. Pt demonstrates significant decreased ROM including R wrist and hand/fingers. Pt with minimal noted wrist or finger AROM. Pt noted to have decreased R wrist strength and unable to test  strength due to inability to complete fist. Pt denies any sensation deficits. Pt continues to be significantly limited. Pt continues to be unable to make full light fist. Pt reports improved pain including 4/10 at rest and up to 6/10 with use at work. Pt tolerates stretching well with minimal reports of increased pain. Pt continues to be significantly limited with finger flexion with pt unable to complete full fist. Pt able to complete HEP Ind and reports completing ~2x a day. Discussed with pt ongoing importance of completing HEP as much as possible due to ongoing stiffness.  Discussed completing multiple times before work, at break during work, and when pt returns home. Discussed use of ice after work due to increasing soreness. Pt verbalized understanding. Continue with POC.      Prognosis: [x]  Good [x]  Fair  []  Poor    Goals:      Patient Goals       Patient goals  increase use of R hand/wrist   Short Term Goals      Time Frame for Short Term Goals 4-6 weeks   Short Term Goal 1 Pt will be Ind with HEP and report completing daily- goal met 2/7   STG 1 Current Status: --   STG Goal 1 Status: --   Short Term Goal 2 Pt will decrease QuickDASH score by 5 points for increased ability to complete ADLs/IADLs- goal met 2/7- update additional 3 points   STG 2 Current Status: --   STG Goal 2 Status: --   Short Term Goal 3 Pt will increase R finger ROM in order to be able to complete light fist and test  strength- ongoing 2/7   STG 3 Current Status: --   STG Goal 3 Status: --   Short Term Goal 4 Pt will increase R wrist flexion by 15 degrees for increased ability to complete ADLs- goal met 2/7- update additional 10 degrees   STG 4 Current Status: --   STG Goal 4 Status: --   Short Term Goal 5 Pt will incease R wrist extension by 15 degrees for increased ability to complete work tasks- goal met 2/7- update additional 10 degrees   STG 5 Current Status: --   STG Goal 5 Status:        Patient Requires Follow-up:  [x]  Yes  []  No    Plan: [x]  Continue per plan of care []  Alter current plan (1-2x a week for 4 weeks)   []  Plan of care initiated []  Hold pending MD visit []  Discharge    Plan for Next Session:      Electronically signed by:  Zak Salter OT,OTR/L

## 2023-03-02 ENCOUNTER — APPOINTMENT (OUTPATIENT)
Dept: OCCUPATIONAL THERAPY | Age: 28
End: 2023-03-02
Payer: COMMERCIAL

## 2023-03-07 ENCOUNTER — HOSPITAL ENCOUNTER (OUTPATIENT)
Dept: OCCUPATIONAL THERAPY | Age: 28
Setting detail: THERAPIES SERIES
Discharge: HOME | End: 2023-03-07
Payer: COMMERCIAL

## 2023-03-07 PROCEDURE — 97110 THERAPEUTIC EXERCISES: CPT

## 2023-03-07 PROCEDURE — 97018 PARAFFIN BATH THERAPY: CPT

## 2023-03-07 NOTE — PROGRESS NOTES
Occupational Therapy      Occupational Therapy Daily Treatment Note    Date:  3/7/2023    Patient Name:  Carmita Zuniga     :  1995  MRN: [id number]    Restrictions/Precautions:    Medical/Treatment Diagnosis Information:  Diagnosis: S52.591A (ICD-10-CM) - Other closed fracture of distal end of right radius, initial encounter  Treatment Diagnosis: decreased ROM and strength of R hand and wrist  Insurance/Certification information:   [facility]  Physician Information:   NGHIA Sky CNP  Plan of care signed (Y/N):  Y  Visit# / total visits:  ,    Pain level: 0/10 at rest and up to 6/10 with use at work at times     G-Code (if applicable):      Date / Visit # G-Code Applied:  /   QuickDASH Total Score: 20 (23)       QuickDASH Disability/Symptom Score : 20.45 % (23)    Progress Note: [x]  Yes  []  No  Next due by: Visit #10      Subjective: Pt reports completing HEP as able throughout day although unsure of how regularly HEP is being completed with return to work. Pt reports completing ~2x a day with one time being during work break. Objective Measures:  Eval 23    Social History:   Social History  Lives With: Spouse (nephew)  Type of Home: House  Functional Status:  Functional Status  Prior level of function: Independent  Occupation: Full time employment  [place] of Occupation: [place] [address]  Job Duties: Moderate lifting  ADL Assistance: Independent  Ambulation Assistance: Independent  Transfer Assistance: Independent  Active : Yes (not since injury)  Objective:     Activity Tolerance  Activity Tolerance: Patient tolerated evaluation without incident  Cognition  Overall Cognitive Status: WFL  LUE AROM (degrees)  LUE AROM : WFL  Left Hand AROM (degrees)  Left Hand AROM: WFL  RUE AROM (degrees)  R Forearm Pron 0-90: 10  R Forearm Supination  0-90: 10  R Wrist Flexion 0-80: 25  R Wrist Extension 0-70: 0  R Wrist Radial Deviation 0-20: 0  R Wrist Ulnar Deviation 0-45: 0  Right Hand AROM (degrees)  Right Hand General AROM: unable to complete full fist ~7 cm of 3rd digit to palm  LUE Strength  Gross LUE Strength: WFL  L Wrist Flex: 5/5  L Wrist Ext: 5/5  L Hand General: 5/5  RUE Strength  Gross RUE Strength: Exceptions to [facility]  R Wrist Flex: 2-/5  R Wrist Ext: 2-/5  R Hand General: 2-/5  Hand Dominance  Hand Dominance: Left  Left Hand Strength -  (lbs)  Handle Setting 2: NA  Right Hand Strength -  (lbs)  Handle Setting 2: NA   2/7/23    Social History:   Social History  Lives With: Spouse (nephew)  Type of Home: House  Functional Status:  Functional Status  Prior level of function: Independent  Occupation: Full time employment  Type of Occupation: [employer]  Job Duties: Moderate lifting  ADL Assistance: Independent  Ambulation Assistance: Independent  Transfer Assistance: Independent  Active : Yes (not since injury)  Objective:     Activity Tolerance  Activity Tolerance: Patient tolerated evaluation without incident  Cognition  Overall Cognitive Status: WFL  LUE AROM (degrees)  LUE AROM : WFL  Left Hand AROM (degrees)  Left Hand AROM: WFL  RUE AROM (degrees)  R Forearm Pron 0-90: 55  R Forearm Supination  0-90: 75  R Wrist Flexion 0-80: 60  R Wrist Extension 0-70: 45  R Wrist Radial Deviation 0-20: 10  R Wrist Ulnar Deviation 0-45: 35  Right Hand AROM (degrees)  Right Hand General AROM: unable to complete full fist ~4.5 cm of 3rd digit to palm  LUE Strength  Gross LUE Strength: WFL  L Wrist Flex: 5/5  L Wrist Ext: 5/5  L Hand General: 5/5  RUE Strength  Gross RUE Strength: Exceptions to [facility]  R Wrist Flex: 4-/5  R Wrist Ext: 4-/5  R Hand General: 2-/5  Hand Dominance  Hand Dominance: Left  Left Hand Strength -  (lbs)  Handle Setting 2: NA  Right Hand Strength -  (lbs)  Handle Setting 2: NA 3/7/23    Social History:   Social History  Lives With: Spouse (nephew)  Type of Home: House  Functional Status:  Functional Status  Prior level of function: Independent  Occupation: Full time employment  Type of Occupation: [employer]  Job Duties: Moderate lifting  ADL Assistance: Independent  Ambulation Assistance: Independent  Transfer Assistance: Independent  Active : Yes (not since injury)  Objective:     Activity Tolerance  Activity Tolerance: Patient tolerated evaluation without incident  Cognition  Overall Cognitive Status: WFL  LUE AROM (degrees)  LUE AROM : WFL  Left Hand AROM (degrees)  Left Hand AROM: WFL  RUE AROM (degrees)  R Forearm Pron 0-90: 70  R Forearm Supination  0-90: 90  R Wrist Flexion 0-80: 72  R Wrist Extension 0-70: 45  R Wrist Radial Deviation 0-20: 17  R Wrist Ulnar Deviation 0-45: 35  Right Hand AROM (degrees)  Right Hand General AROM: unable to complete full fist ~3.5 cm of 3rd digit to palm  LUE Strength  Gross LUE Strength: WFL  L Wrist Flex: 5/5  L Wrist Ext: 5/5  L Hand General: 5/5  RUE Strength  Gross RUE Strength: Exceptions to [facility]  R Wrist Flex: 4/5  R Wrist Ext: 4/5  R Hand General: 3+/5  Hand Dominance  Hand Dominance: Left  Left Hand Strength -  (lbs)  Handle Setting 2:  Handle Setting 2:  Trial 1: 66  Trial 2: 66  Trial 3: 66  Average: 66    Right Hand Strength -  (lbs)  Handle Setting 2:  Trial 1: 12  Trial 2: 12  Trial 3: 12  Average: 12                                   Therapeutic Exercise:   Exercise/Equipment  Resistance/Repetitions   Other comments   AROM   X10 reps    Elbow:   Flex  Ext    Forearm:  Pronation/supination    Wrist:  Flex/ext  Ulnar/radial deviation  Circumduction    Hand:  Grasp/release (in wrist flexion, neurtral, and extension)  AB/AD  opposition   IStretches   X10-15 reps; x30 second hold    Wrist:  Flex/ext  Ulnar/radial deviation    Hand/Finger:  Flex/ext:  Full palm  Individual finger:  MCP  PIP  DIP   Improved PROM         Home Exercise Program:    Pt issued handout for AROM and stretches  Issued pink theraputty    Manual Treatments:      Modalities:  x10 minutes Paraffin to R wrist, hand, and fingers; fingers with passive stretch into fist during paraffin    Timed Code Treatment Minutes:  45 minutes     Total Treatment Minutes:  40 minutes (1599-0383)    Charges:  X1 Paraffin   X2 Ther Ex    Treatment/Activity Tolerance:     [x]  Patient tolerated treatment well []  Patient limited by fatigue    []  Patient limited by pain []  Patient limited by other medical complications   []  Other:     Assessment: Pt is a 32 y.o female who is s/p OPEN REDUCTION INTERNAL FIXATION RIGHT DISTAL RADIUS, 12/5/2022. Pt was placed in a [employer] Wrist Long Forearm Brace. Pt now presents to OP OT with complaints of ongoing R wrist deficits. Pt demonstrates significant decreased ROM including R wrist and hand/fingers. Pt with minimal noted wrist or finger AROM. Pt continues to bake progress towards set goals. Pt has met 6/9 goals at this time. Pt demonstrates improved AROM including wrist flexion and forearm pronation/supination. Pt continues to be significantly limited with wrist extension. Pt demonstrates improved wrist strength including 4/5 and pt able to test R  strength this date. Pt reports improved pain at rest but continues to have increased pain with use. Pt demonstrates ability to complete HEP Ind, although only reports completing HEP ~2x a day. Discussed with pt in length about importance of increasing amount of times HEP is completed in order to progress towards on going goals. Pt verbalized understanding. Pt will continue to benefit from skilled OT services at this time at a frequency of 1x a week for 4 weeks.      Prognosis: [x]  Good [x]  Fair  []  Poor    Goals:      Patient Goals       Patient goals  increase use of R hand/wrist   Short Term Goals      Time Frame for Short Term Goals 4-6 weeks   Short Term Goal 1 Pt will be Ind with HEP and report completing daily- goal met 2/7   STG 1 Current Status: --   STG Goal 1 Status: --   Short Term Goal 2 Pt will decrease QuickDASH score by 5 points for increased ability to complete ADLs/IADLs- goal met 2/7- update additional 3 points- ongoing 3/7   STG 2 Current Status: --   STG Goal 2 Status: --   Short Term Goal 3 Pt will increase R finger ROM in order to be able to complete light fist and test  strength- goal met 3/7;     Updated goal:  Pt will increase R hand  strength by 10#    STG 3 Current Status: --   STG Goal 3 Status: --   Short Term Goal 4 Pt will increase R wrist flexion by 15 degrees for increased ability to complete ADLs- goal met 2/7- update additional 10 degrees- goal met 3/7   STG 4 Current Status: --   STG Goal 4 Status: --   Short Term Goal 5 Pt will incease R wrist extension by 15 degrees for increased ability to complete work tasks- goal met 2/7- update additional 10 degrees- ongoing 3/7   STG 5 Current Status: --   STG Goal 5 Status:        Patient Requires Follow-up:  [x]  Yes  []  No    Plan: []  Continue per plan of care [x]  Alter current plan (1x a week for 4 weeks)   []  Plan of care initiated []  Hold pending MD visit []  Discharge    Plan for Next Session:      Electronically signed by:  Kalen Wolf OT,OTR/L

## 2023-03-07 NOTE — PROGRESS NOTES
Occupational Therapy      Outpatient Occupational Therapy  [] [facility] [address] MATEUS VALLE   Phone: [phone]   Fax: [phone]   [x] [facility]           Phone: [phone]   Fax: [phone]  [] [place]   Phone: [phone]   Fax: [phone]     Occupational Therapy Progress Note  Date: 3/7/2023        Patient Name:  Beba Collins    :  1995  MRN: [id number]    Restrictions/Precautions:    Medical/Treatment Diagnosis Information:  Diagnosis: R85.958G (ICD-10-CM) - Other closed fracture of distal end of right radius, initial encounter  Treatment Diagnosis: decreased ROM and strength of R hand and wrist  Insurance/Certification information:   [facility]  Physician Information:   NGHIA Nolen - CNP  Plan of care signed (Y/N):  Y  Visit# / total visits:  ,    Pain level:      0/10 at rest and up to 6/10 with use at work at times      G-Code (if applicable):                                             Date / Visit # G-Code Applied:  /   QuickDASH Total Score: 20 (23)       QuickDASH Disability/Symptom Score : 20.45 % (23)    Time Period for Report:  23-3/7/23  Cancels/No-shows to date:  4 cancels, 0 no shows    Plan of Care/Treatment to date:  [x] Therapeutic Exercise    [x] Modalities:  [] Therapeutic Activity     [] Ultrasound  [] Electrical Stimulation  [] Total Motion Release     [] Fluidotherapy [] Stephanie Kanner  [] Neuromuscular Re-education  [x] Cold/hotpack [] Iontophoresis  [x] Instruction in HEP     Other: Paraffin   [x] Manual Therapy      [x]            [] Aquatic Therapy      []                         Significant Findings At Last Visit/Comments:    Subjective:    Pt reports completing HEP as able throughout day although unsure of how regularly HEP is being completed with return to work. Pt reports completing ~2x a day with one time being during work break.       Objective:  Eval 23     Social History:   Social History  Lives With: Spouse (nephew)  Type of Home: House  Functional Status:  Functional Status  Prior level of function: Independent  Occupation: Full time employment  Type of Occupation: [employer]  Job Duties: Moderate lifting  ADL Assistance: Independent  Ambulation Assistance: Independent  Transfer Assistance: Independent  Active : Yes (not since injury)  Objective: Activity Tolerance  Activity Tolerance: Patient tolerated evaluation without incident  Cognition  Overall Cognitive Status: WFL  LUE AROM (degrees)  LUE AROM : WFL  Left Hand AROM (degrees)  Left Hand AROM: WFL  RUE AROM (degrees)  R Forearm Pron 0-90: 10  R Forearm Supination  0-90: 10  R Wrist Flexion 0-80: 25  R Wrist Extension 0-70: 0  R Wrist Radial Deviation 0-20: 0  R Wrist Ulnar Deviation 0-45: 0  Right Hand AROM (degrees)  Right Hand General AROM: unable to complete full fist ~7 cm of 3rd digit to palm  LUE Strength  Gross LUE Strength: WFL  L Wrist Flex: 5/5  L Wrist Ext: 5/5  L Hand General: 5/5  RUE Strength  Gross RUE Strength: Exceptions to [facility]  R Wrist Flex: 2-/5  R Wrist Ext: 2-/5  R Hand General: 2-/5  Hand Dominance  Hand Dominance: Left  Left Hand Strength -  (lbs)  Handle Setting 2: NA  Right Hand Strength -  (lbs)  Handle Setting 2: NA    2/7/23     Social History:   Social History  Lives With: Spouse (nephew)  Type of Home: House  Functional Status:  Functional Status  Prior level of function: Independent  Occupation: Full time employment  Type of Occupation: [place] [address]  Job Duties: Moderate lifting  ADL Assistance: Independent  Ambulation Assistance: Independent  Transfer Assistance: Independent  Active : Yes (not since injury)  Objective:     Activity Tolerance  Activity Tolerance: Patient tolerated evaluation without incident  Cognition  Overall Cognitive Status: WFL  LUE AROM (degrees)  LUE AROM : WFL  Left Hand AROM (degrees)  Left Hand AROM: WFL  RUE AROM (degrees)  R Forearm Pron 0-90: 55  R Forearm Supination  0-90: 75  R Wrist Flexion 0-80: 60  R Wrist Extension 0-70: 45  R Wrist Radial Deviation 0-20: 10  R Wrist Ulnar Deviation 0-45: 35  Right Hand AROM (degrees)  Right Hand General AROM: unable to complete full fist ~4.5 cm of 3rd digit to palm  LUE Strength  Gross LUE Strength: WFL  L Wrist Flex: 5/5  L Wrist Ext: 5/5  L Hand General: 5/5  RUE Strength  Gross RUE Strength: Exceptions to [facility]  R Wrist Flex: 4-/5  R Wrist Ext: 4-/5  R Hand General: 2-/5  Hand Dominance  Hand Dominance: Left  Left Hand Strength -  (lbs)  Handle Setting 2: NA  Right Hand Strength -  (lbs)  Handle Setting 2: NA 3/7/23     Social History:   Social History  Lives With: Spouse (nephew)  Type of Home: House  Functional Status:  Functional Status  Prior level of function: Independent  Occupation: Full time employment  Type of Occupation: [employer]  Job Duties: Moderate lifting  ADL Assistance: Independent  Ambulation Assistance: Independent  Transfer Assistance: Independent  Active : Yes (not since injury)  Objective:     Activity Tolerance  Activity Tolerance: Patient tolerated evaluation without incident  Cognition  Overall Cognitive Status: WFL  LUE AROM (degrees)  LUE AROM : WFL  Left Hand AROM (degrees)  Left Hand AROM: WFL  RUE AROM (degrees)  R Forearm Pron 0-90: 70  R Forearm Supination  0-90: 90  R Wrist Flexion 0-80: 72  R Wrist Extension 0-70: 45  R Wrist Radial Deviation 0-20: 17  R Wrist Ulnar Deviation 0-45: 35  Right Hand AROM (degrees)  Right Hand General AROM: unable to complete full fist ~3.5 cm of 3rd digit to palm  LUE Strength  Gross LUE Strength: WFL  L Wrist Flex: 5/5  L Wrist Ext: 5/5  L Hand General: 5/5  RUE Strength  Gross RUE Strength: Exceptions to [facility]  R Wrist Flex: 4/5  R Wrist Ext: 4/5  R Hand General: 3+/5  Hand Dominance  Hand Dominance: Left  Left Hand Strength -  (lbs)  Handle Setting 2:  Handle Setting 2:  Trial 1: 66  Trial 2: 66  Trial 3: 66  Average: 66     Right Hand Strength -  (lbs)  Handle Setting 2:  Trial 1: 12  Trial 2: 12  Trial 3: 12  Average: 12        Assessment:  Pt is a 32 y.o female who is s/p OPEN REDUCTION INTERNAL FIXATION RIGHT DISTAL RADIUS, 12/5/2022. Pt was placed in a [employer] Titan Wrist Long Forearm Brace. Pt now presents to OP OT with complaints of ongoing R wrist deficits. Pt demonstrates significant decreased ROM including R wrist and hand/fingers. Pt with minimal noted wrist or finger AROM. Pt continues to bake progress towards set goals. Pt has met 6/9 goals at this time. Pt demonstrates improved AROM including wrist flexion and forearm pronation/supination. Pt continues to be significantly limited with wrist extension. Pt demonstrates improved wrist strength including 4/5 and pt able to test R  strength this date. Pt reports improved pain at rest but continues to have increased pain with use. Pt demonstrates ability to complete HEP Ind, although only reports completing HEP ~2x a day. Discussed with pt in length about importance of increasing amount of times HEP is completed in order to progress towards on going goals. Pt verbalized understanding. Pt will continue to benefit from skilled OT services at this time at a frequency of 1x a week for 4 weeks.      Progress towards goals:    Patient Goals       Patient goals  increase use of R hand/wrist   Short Term Goals      Time Frame for Short Term Goals 4-6 weeks   Short Term Goal 1 Pt will be Ind with HEP and report completing daily- goal met 2/7   STG 1 Current Status: --   STG Goal 1 Status: --   Short Term Goal 2 Pt will decrease QuickDASH score by 5 points for increased ability to complete ADLs/IADLs- goal met 2/7- update additional 3 points- ongoing 3/7   STG 2 Current Status: --   STG Goal 2 Status: --   Short Term Goal 3 Pt will increase R finger ROM in order to be able to complete light fist and test  strength- goal met 3/7;      Updated goal:  Pt will increase R hand  strength by 10#    STG 3 Current Status: --   STG Goal 3 Status: --   Short Term Goal 4 Pt will increase R wrist flexion by 15 degrees for increased ability to complete ADLs- goal met 2/7- update additional 10 degrees- goal met 3/7   STG 4 Current Status: --   STG Goal 4 Status: --   Short Term Goal 5 Pt will incease R wrist extension by 15 degrees for increased ability to complete work tasks- goal met 2/7- update additional 10 degrees- ongoing 3/7   STG 5 Current Status: --   STG Goal 5 Status:         Current Frequency/Duration:  # Days per week: [x] 1 day # Weeks: [] 1 week [x] 4 weeks      [] 2 days   [] 2 weeks [] 5 weeks      [] 3 days   [] 3 weeks [] 6 weeks     Rehab Potential: [] Excellent [x] Good [x] Fair  [] Poor     Goal Status:  [] Achieved [x] Partially Achieved  [] Not Achieved     Patient Status: [] Continue per initial plan of Care     [] Patient now discharged     [x] Additional visits requested, Please re-certify for additional visits:      Requested frequency/duration:  1X/week for 4 weeks    Electronically signed by:  Regina Ansari OT, OTR/L    If you have any questions or concerns, please don't hesitate to call.   Thank you for your referral.    Physician Signature:________________________________Date:__________________  By signing above, therapists plan is approved by physician

## 2023-03-09 ENCOUNTER — APPOINTMENT (OUTPATIENT)
Dept: OCCUPATIONAL THERAPY | Age: 28
End: 2023-03-09
Payer: COMMERCIAL

## 2023-03-14 ENCOUNTER — HOSPITAL ENCOUNTER (OUTPATIENT)
Dept: OCCUPATIONAL THERAPY | Age: 28
Setting detail: THERAPIES SERIES
Discharge: HOME | End: 2023-03-14
Payer: COMMERCIAL

## 2023-03-14 NOTE — PROGRESS NOTES
Occupational Therapy      Occupational Therapy  Cancellation/No-show Note  Patient Name:  Connie Osei   :  1995   Date:  3/14/2023  Cancelled visits to date: 5  No-shows to date: 0    For today's appointment patient:  [x]    Cancelled  []    Rescheduled appointment  []    No-show     Reason given by patient:  []    Patient ill  []    Conflicting appointment  []    No transportation    []    Conflict with work  []    No reason given  [x]    Other:     Comments:   Pt reports unable to attend today's session.     Electronically signed by:  Ashvin Schneider OT, OTR/L

## 2023-03-21 ENCOUNTER — HOSPITAL ENCOUNTER (OUTPATIENT)
Dept: OCCUPATIONAL THERAPY | Age: 28
Setting detail: THERAPIES SERIES
Discharge: HOME | End: 2023-03-21
Payer: COMMERCIAL

## 2023-03-28 ENCOUNTER — HOSPITAL ENCOUNTER (OUTPATIENT)
Dept: OCCUPATIONAL THERAPY | Age: 28
Setting detail: THERAPIES SERIES
Discharge: HOME | End: 2023-03-28
Payer: COMMERCIAL

## 2023-03-28 NOTE — PROGRESS NOTES
Occupational Therapy      Occupational Therapy  Cancellation/No-show Note  Patient Name:  True Else   :  1995   Date:  3/28/2023  Cancelled visits to date: 5  No-shows to date: 2    For today's appointment patient:  []    Cancelled  []    Rescheduled appointment  [x]    No-show     Reason given by patient:  []    Patient ill  []    Conflicting appointment  []    No transportation    []    Conflict with work  []    No reason given  [x]    Other:     Comments:   Called placed to pt. Pt reports phone was dead and was charging to call to report unable to attend. Pt reports MD appointment tomorrow. Discussed plan to see what MD says and D/C pending appointment outcome due to inability to regularly attend scheduled session.  Pt reports plan to call after MD appointment tomorrow to update therapist.     Electronically signed by:  Mita Reyes, OT, OTR/L

## 2023-03-29 ENCOUNTER — OFFICE VISIT (OUTPATIENT)
Dept: ORTHOPEDIC SURGERY | Age: 28
End: 2023-03-29

## 2023-03-29 VITALS — HEIGHT: 55 IN | BODY MASS INDEX: 27.31 KG/M2 | WEIGHT: 118 LBS

## 2023-03-29 DIAGNOSIS — M25.641 HAND JOINT STIFF, RIGHT: ICD-10-CM

## 2023-03-29 DIAGNOSIS — S52.591A OTHER CLOSED FRACTURE OF DISTAL END OF RIGHT RADIUS, INITIAL ENCOUNTER: Primary | ICD-10-CM

## 2023-03-30 PROBLEM — M25.641 HAND JOINT STIFF, RIGHT: Status: ACTIVE | Noted: 2023-03-30

## 2023-03-30 RX ORDER — NAPROXEN 500 MG/1
500 TABLET ORAL 2 TIMES DAILY WITH MEALS
Qty: 60 TABLET | Refills: 0 | Status: SHIPPED | OUTPATIENT
Start: 2023-03-30 | End: 2023-04-29

## 2023-03-30 NOTE — PROGRESS NOTES
with the active or passive range of motion of the right wrist, but decrease ROM. She is unable to make a full fist she can discontinue the. She  has intact sensation, distally, and she  is neurovascularly intact. IMAGING:  Three views right wrist taken today in the office showed anatomic alignment of right distal radius, plate and screws in good position, no loosening, there is a mild step-off. Distal ulna fracture. IMPRESSION:    1-Right distal radius ORIF and doing very well. 2-Right distal ulna fracture  3-Hand stiffness. PLAN:  I have told the patient to work on aggressive ROM, as well as strengthening exercises. I discussed with the patient that I think that she would really benefit from a course of occupational therapy for further strengthening and stretching and she was instructed to continue aggressive OT. Naprosyn Rx sent The patient will come back for a follow up in 2 months. At that time, we will take 3 views of the right wrist.     As this patient has demonstrated risk factors for osteoporosis, such as age and evidence of a fracture, I have referred the patient back to the primary care physician for evaluation for osteoporosis, including consideration for DEXA scanning, if this is felt to be clinically indicated. The patient is advised to contact the primary care physician to follow-up for further evaluation.        Brenda Ordonez MD

## 2023-04-04 ENCOUNTER — HOSPITAL ENCOUNTER (OUTPATIENT)
Dept: OCCUPATIONAL THERAPY | Age: 28
Setting detail: THERAPIES SERIES
Discharge: HOME | End: 2023-04-04

## 2023-04-04 NOTE — PROGRESS NOTES
Occupational Therapy      Occupational Therapy  Cancellation/No-show Note  Patient Name:  Bridget Lubin   :  1995   Date:  2023  Cancelled visits to date: 10  No-shows to date: 2    For today's appointment patient:  [x]    Cancelled  []    Rescheduled appointment  []    No-show     Reason given by patient:  []    Patient ill  []    Conflicting appointment  []    No transportation    []    Conflict with work  []    No reason given  [x]    Other:     Comments:   Pt reports she continues to have difficulty attending due to work. Pt requesting to schedule for  due to not having work. Discussed with pt that is any additional visits are missed, will need to discharge.      Electronically signed by:  Stew Perez OT, OTR/L

## 2023-04-17 ENCOUNTER — APPOINTMENT (OUTPATIENT)
Dept: OCCUPATIONAL THERAPY | Age: 28
End: 2023-04-17
Payer: COMMERCIAL

## 2023-04-24 ENCOUNTER — APPOINTMENT (OUTPATIENT)
Dept: OCCUPATIONAL THERAPY | Age: 28
End: 2023-04-24
Payer: COMMERCIAL

## 2024-08-02 ENCOUNTER — TELEPHONE (OUTPATIENT)
Dept: INTERNAL MEDICINE CLINIC | Age: 29
End: 2024-08-02
